# Patient Record
Sex: FEMALE | Race: WHITE | Employment: FULL TIME | URBAN - METROPOLITAN AREA
[De-identification: names, ages, dates, MRNs, and addresses within clinical notes are randomized per-mention and may not be internally consistent; named-entity substitution may affect disease eponyms.]

---

## 2019-11-05 ENCOUNTER — APPOINTMENT (OUTPATIENT)
Dept: CT IMAGING | Age: 59
End: 2019-11-05
Payer: COMMERCIAL

## 2019-11-05 ENCOUNTER — HOSPITAL ENCOUNTER (EMERGENCY)
Age: 59
Discharge: HOME | End: 2019-11-05
Payer: COMMERCIAL

## 2019-11-05 VITALS
SYSTOLIC BLOOD PRESSURE: 135 MMHG | HEART RATE: 57 BPM | BODY MASS INDEX: 41.02 KG/M2 | WEIGHT: 231.48 LBS | OXYGEN SATURATION: 95 % | TEMPERATURE: 96.9 F | HEIGHT: 63 IN | RESPIRATION RATE: 16 BRPM | DIASTOLIC BLOOD PRESSURE: 78 MMHG

## 2019-11-05 DIAGNOSIS — S02.2XXB OPEN FRACTURE OF NASAL BONE, INITIAL ENCOUNTER: ICD-10-CM

## 2019-11-05 DIAGNOSIS — S00.83XA CONTUSION OF FACE, INITIAL ENCOUNTER: ICD-10-CM

## 2019-11-05 DIAGNOSIS — S01.81XA FACIAL LACERATION, INITIAL ENCOUNTER: ICD-10-CM

## 2019-11-05 DIAGNOSIS — S09.90XA INJURY OF HEAD, INITIAL ENCOUNTER: Primary | ICD-10-CM

## 2019-11-05 PROCEDURE — 4500000024 HC ED LEVEL 4 PROCEDURE

## 2019-11-05 PROCEDURE — 99284 EMERGENCY DEPT VISIT MOD MDM: CPT

## 2019-11-05 PROCEDURE — 70486 CT MAXILLOFACIAL W/O DYE: CPT

## 2019-11-05 PROCEDURE — 2500000003 HC RX 250 WO HCPCS: Performed by: PHYSICIAN ASSISTANT

## 2019-11-05 PROCEDURE — 70450 CT HEAD/BRAIN W/O DYE: CPT

## 2019-11-05 RX ORDER — AMOXICILLIN AND CLAVULANATE POTASSIUM 875; 125 MG/1; MG/1
1 TABLET, FILM COATED ORAL 2 TIMES DAILY
Qty: 20 TABLET | Refills: 0 | Status: SHIPPED | OUTPATIENT
Start: 2019-11-05 | End: 2019-11-15

## 2019-11-05 RX ORDER — LIDOCAINE HYDROCHLORIDE AND EPINEPHRINE BITARTRATE 20; .01 MG/ML; MG/ML
20 INJECTION, SOLUTION SUBCUTANEOUS ONCE
Status: COMPLETED | OUTPATIENT
Start: 2019-11-05 | End: 2019-11-05

## 2019-11-05 RX ADMIN — LIDOCAINE HYDROCHLORIDE AND EPINEPHRINE 20 ML: 20; 10 INJECTION, SOLUTION INFILTRATION; PERINEURAL at 09:37

## 2019-11-05 ASSESSMENT — PAIN DESCRIPTION - PROGRESSION: CLINICAL_PROGRESSION: GRADUALLY WORSENING

## 2019-11-05 ASSESSMENT — PAIN SCALES - GENERAL
PAINLEVEL_OUTOF10: 5
PAINLEVEL_OUTOF10: 5

## 2019-11-05 ASSESSMENT — PAIN DESCRIPTION - PAIN TYPE: TYPE: ACUTE PAIN

## 2019-11-05 ASSESSMENT — PAIN - FUNCTIONAL ASSESSMENT: PAIN_FUNCTIONAL_ASSESSMENT: ACTIVITIES ARE NOT PREVENTED

## 2019-11-05 ASSESSMENT — PAIN DESCRIPTION - FREQUENCY: FREQUENCY: CONTINUOUS

## 2019-11-05 ASSESSMENT — PAIN DESCRIPTION - DESCRIPTORS: DESCRIPTORS: ACHING

## 2019-11-05 ASSESSMENT — ENCOUNTER SYMPTOMS
NAUSEA: 0
VOMITING: 0

## 2019-11-05 ASSESSMENT — PAIN DESCRIPTION - LOCATION: LOCATION: FACE

## 2019-11-05 ASSESSMENT — PAIN DESCRIPTION - ONSET: ONSET: ON-GOING

## 2019-11-08 ENCOUNTER — OFFICE VISIT (OUTPATIENT)
Dept: ENT CLINIC | Age: 59
End: 2019-11-08
Payer: COMMERCIAL

## 2019-11-08 VITALS
DIASTOLIC BLOOD PRESSURE: 70 MMHG | HEART RATE: 67 BPM | WEIGHT: 229 LBS | HEIGHT: 63 IN | TEMPERATURE: 97.7 F | BODY MASS INDEX: 40.57 KG/M2 | SYSTOLIC BLOOD PRESSURE: 143 MMHG

## 2019-11-08 DIAGNOSIS — S02.2XXA CLOSED FRACTURE OF NASAL BONE, INITIAL ENCOUNTER: Primary | ICD-10-CM

## 2019-11-08 DIAGNOSIS — S01.81XA FACIAL LACERATION, INITIAL ENCOUNTER: ICD-10-CM

## 2019-11-08 PROCEDURE — G8417 CALC BMI ABV UP PARAM F/U: HCPCS | Performed by: OTOLARYNGOLOGY

## 2019-11-08 PROCEDURE — G8427 DOCREV CUR MEDS BY ELIG CLIN: HCPCS | Performed by: OTOLARYNGOLOGY

## 2019-11-08 PROCEDURE — 3017F COLORECTAL CA SCREEN DOC REV: CPT | Performed by: OTOLARYNGOLOGY

## 2019-11-08 PROCEDURE — 99203 OFFICE O/P NEW LOW 30 MIN: CPT | Performed by: OTOLARYNGOLOGY

## 2019-11-08 PROCEDURE — G8484 FLU IMMUNIZE NO ADMIN: HCPCS | Performed by: OTOLARYNGOLOGY

## 2019-11-08 PROCEDURE — 4004F PT TOBACCO SCREEN RCVD TLK: CPT | Performed by: OTOLARYNGOLOGY

## 2019-11-08 PROCEDURE — S0630 REMOVAL OF SUTURES: HCPCS | Performed by: OTOLARYNGOLOGY

## 2019-11-08 ASSESSMENT — ENCOUNTER SYMPTOMS
RHINORRHEA: 0
SHORTNESS OF BREATH: 0
COLOR CHANGE: 0
CHOKING: 0
BLOOD IN STOOL: 0
DIARRHEA: 0
BACK PAIN: 0
COUGH: 0
VOICE CHANGE: 0
TROUBLE SWALLOWING: 0
STRIDOR: 0
VOMITING: 0
FACIAL SWELLING: 1
SINUS PAIN: 1
WHEEZING: 0
CONSTIPATION: 0
EYE ITCHING: 0
NAUSEA: 0
SINUS PRESSURE: 0
EYE DISCHARGE: 0
PHOTOPHOBIA: 0
SORE THROAT: 0

## 2019-11-13 ENCOUNTER — OFFICE VISIT (OUTPATIENT)
Dept: ENT CLINIC | Age: 59
End: 2019-11-13
Payer: COMMERCIAL

## 2019-11-13 VITALS
DIASTOLIC BLOOD PRESSURE: 68 MMHG | HEART RATE: 66 BPM | WEIGHT: 236 LBS | HEIGHT: 63 IN | SYSTOLIC BLOOD PRESSURE: 153 MMHG | TEMPERATURE: 97.4 F | BODY MASS INDEX: 41.82 KG/M2

## 2019-11-13 DIAGNOSIS — S02.2XXA CLOSED FRACTURE OF NASAL BONE, INITIAL ENCOUNTER: Primary | ICD-10-CM

## 2019-11-13 DIAGNOSIS — S01.81XA FACIAL LACERATION, INITIAL ENCOUNTER: ICD-10-CM

## 2019-11-13 PROCEDURE — 4004F PT TOBACCO SCREEN RCVD TLK: CPT | Performed by: OTOLARYNGOLOGY

## 2019-11-13 PROCEDURE — G8427 DOCREV CUR MEDS BY ELIG CLIN: HCPCS | Performed by: OTOLARYNGOLOGY

## 2019-11-13 PROCEDURE — 99213 OFFICE O/P EST LOW 20 MIN: CPT | Performed by: OTOLARYNGOLOGY

## 2019-11-13 PROCEDURE — G8417 CALC BMI ABV UP PARAM F/U: HCPCS | Performed by: OTOLARYNGOLOGY

## 2019-11-13 PROCEDURE — 3017F COLORECTAL CA SCREEN DOC REV: CPT | Performed by: OTOLARYNGOLOGY

## 2019-11-13 PROCEDURE — G8484 FLU IMMUNIZE NO ADMIN: HCPCS | Performed by: OTOLARYNGOLOGY

## 2019-11-13 ASSESSMENT — ENCOUNTER SYMPTOMS
TROUBLE SWALLOWING: 0
FACIAL SWELLING: 0
EYE PAIN: 0
VOICE CHANGE: 0
SHORTNESS OF BREATH: 0
DIARRHEA: 0
RHINORRHEA: 0
COLOR CHANGE: 0
SORE THROAT: 0
SINUS PRESSURE: 0
COUGH: 0
CHOKING: 0
EYE ITCHING: 0
EYE REDNESS: 0
PHOTOPHOBIA: 0
NAUSEA: 0
STRIDOR: 0
SINUS PAIN: 0

## 2020-12-10 ENCOUNTER — HOSPITAL ENCOUNTER (OUTPATIENT)
Dept: PHYSICAL THERAPY | Age: 60
Setting detail: THERAPIES SERIES
Discharge: HOME | End: 2020-12-10
Payer: COMMERCIAL

## 2020-12-10 PROCEDURE — 97140 MANUAL THERAPY 1/> REGIONS: CPT

## 2020-12-10 PROCEDURE — 97110 THERAPEUTIC EXERCISES: CPT

## 2020-12-10 PROCEDURE — 97161 PT EVAL LOW COMPLEX 20 MIN: CPT

## 2020-12-10 NOTE — FLOWSHEET NOTE
[facility] - Outpatient Rehabilitation & Therapy  [address]. [place]  Phone: [phone]   Fax:     [phone]      Physical Therapy Treatment Note/ Progress Report:     Date:  12/10/2020    Patient Name:  Jess Cullen    :  1960  MRN: [id number]    Pertinent Medical History:Additional Pertinent Hx: HTN    Medical/Treatment Diagnosis Information:  · Diagnosis: Left Distal Femoral Jjacajng34/4/20  · Treatment Diagnosis: decreased abilty to a,mulate and function    Insurance/Certification information:  PT Insurance Information: [employer]  Physician Information:  Referring Practitioner: Dr. Anirudh Flor of care signed (Y/N): routed    Date of Patient follow up with Physician:      Progress Report: []  Yes  [x]  No     Date Range for reporting period:  Beginnin/10/2020  Ending:      Progress report due (10 Rx/or 30 days whichever is less):      Recertification due (POC duration/ or 90 days whichever is less): 3/10/20     Visit # POC/Insurance Allowable Auth Needed   1 30 []Yes   []No     Latex Allergy:  [x]NO      []YES  Preferred Language for Healthcare:   [x]English       []Other:    Functional Scale:      Date assessed: at eval  Test: LEFS-22  Score:    Pain level:  5/10     History of Injury: Patient is a 64 y/o female who fell in the shower and fx her distal femur on 20 with a resultant surgery on 20. She was seen at home for PT up until last week. She c/o moderate pain at this time. She wakes from pain and is limited with all activities. She is ambulating with a walker and a rolling walker and I fwb with the walker. She works a a  and sits most of the day. She hoeps to return to full function. She did fall last week at home. She had a Retrograde intramedullary rodding procedure to fix it.     SUBJECTIVE:  See eval    OBJECTIVE:  ROM LEFT RIGHT   HIP Flex 80 wfl   HIP Abd 15     HIP Ext 5     HIP IR 15     HIP ER 10     Knee ext -15     Knee Flex 90     Ankle PF wfl     Ankle DF 5     Ankle In       Ankle Ev       Strength  LEFT RIGHT   HIP Flexors 4-     HIP Abductors 4-     HIP Ext 4-     Hip ER 4-     Knee EXT (quad) 3     Knee Flex (HS) 3+-     Ankle DF 4+     Ankle PF 4+     Ankle Inv 4+     Ankle EV 4+             Circumference  Mid apex  7 cm prox    60  67                  RESTRICTIONS/PRECAUTIONS: fwb in walker    Exercises/Interventions:     Therapeutic Ex (74759)   Min: Reps/Resistance Notes/CUES   Nu step     Leg press     qs     laq/saq     clams     Sl abd     Wobble      Step ups     Semi squat     Gait with cane                    Manual Intervention (12272)  Min:     Knee mobs/PROM Gr 3    Tib/Fem Mobs Gr 3    Patella Mobs gr3    Ankle mobs Prom flex and ext              NMR re-education (58985)  Min:  CUES NEEDED             Therapeutic Activity (53984)  Min:               Modalities  Min:     IFC with      CP after exercises     MH after exercises            Other Therapeutic Activities: Pt was educated on PT POC, Diagnosis, Prognosis, pathomechanics as well as frequency and duration of scheduling future physical therapy appointments. Time was also taken on this day to answer all patient questions and participation in PT. Reviewed appointment policy in detail with patient and patient verbalized understanding. Home Exercise Program: Patient was instructed in the following for HEP:     . Patient verbalized/demonstrated understanding and was issued written handout.   HEP  Hams stretch, gastroc stretch 30 x 3, qs x 20, slr x 20, seated flex stretch 30 x 3      Therapeutic Exercise and NMR EXR  [] (68747) Provided verbal/tactile cueing for activities related to strengthening, flexibility, endurance, ROM for improvements in LE, proximal hip, and core control with self care, mobility, lifting, ambulation.  [] (00588) Provided verbal/tactile cueing for activities related to improving balance, coordination, kinesthetic sense, posture, motor skill, proprioception  to assist with LE, proximal hip, and core control in self care, mobility, lifting, ambulation and eccentric single leg control. NMR and Therapeutic Activities:    [] (48749 or 76522) Provided verbal/tactile cueing for activities related to improving balance, coordination, kinesthetic sense, posture, motor skill, proprioception and motor activation to allow for proper function of core, proximal hip and LE with self care and ADLs and functional mobility.   [] (54772) Gait Re-education- Provided training and instruction to the patient for proper LE, core and proximal hip recruitment and positioning and eccentric body weight control with ambulation re-education including up and down stairs     Home Exercise Program:    [x] (45358) Reviewed/Progressed HEP activities related to strengthening, flexibility, endurance, ROM of core, proximal hip and LE for functional self-care, mobility, lifting and ambulation/stair navigation   [] (49451)Reviewed/Progressed HEP activities related to improving balance, coordination, kinesthetic sense, posture, motor skill, proprioception of core, proximal hip and LE for self care, mobility, lifting, and ambulation/stair navigation      Manual Treatments:  PROM / STM / Oscillations-Mobs:  G-I, II, III, IV (PA's, Inf., Post.)  [] (38241) Provided manual therapy to mobilize LE, proximal hip and/or LS spine soft tissue/joints for the purpose of modulating pain, promoting relaxation,  increasing ROM, reducing/eliminating soft tissue swelling/inflammation/restriction, improving soft tissue extensibility and allowing for proper ROM for normal function with self care, mobility, lifting and ambulation.      If [facility] Please Indicate Time In/Out  CPT Code Time in Time out                         Charges:  Timed Code Treatment Minutes: 30   Total Treatment Minutes: 45      [x] EVAL (LOW) 31369 (typically 20 minutes face-to-face)  [] EVAL (MOD) 98919 (typically 30 minutes face-to-face)  [] EVAL (HIGH) 38250 (typically 45 minutes face-to-face)  [] RE-EVAL     [x] OV(92884) x 1   [] Dry needle 1 or 2 Muscles (21661)  [] NMR (76321) x     [] Dry needle 3+ Muscles (73458)  [x] Manual (48722) x1     [] Ultrasound (09315) x  [] TA (32253) x     [] Mech Traction (06360)  [] ES(attended) (53091)     [] ES (un) (29258):   [] Vasopump (02488) [] Ionto (16552)   [] Other:    Edmond Emerson:  Patient stated goal: \" I want to walk and function like normal \"  []? Progressing: []? Met: []? Not Met: []? Adjusted     Therapist goals for Patient:   Short Term Goals: To be achieved in: 2 weeks  1. Independent in HEP and progression per patient tolerance, in order to prevent re-injury. []? Progressing: []? Met: []? Not Met: []? Adjusted  2. Patient will have a decrease in pain by 25% to facilitate improvement in movement, function, and ADLs as indicated by Functional Deficits. []? Progressing: []? Met: []? Not Met: []? Adjusted     Long Term Goals: To be achieved in: 8 weeks  1. Disability index score of 25% or less for the LEFS to assist with reaching prior level of function. []? Progressing: []? Met: []? Not Met: []? Adjusted  2. Patient will demonstrate increased AROM to 110, -5  to allow for proper joint functioning as indicated by patients Functional Deficits. []? Progressing: []? Met: []? Not Met: []? Adjusted  3. Patient will demonstrate an increase in Strength to good proximal hip strength and control, within 5lb HHD in LE to allow for proper functional mobility as indicated by patients Functional Deficits. []? Progressing: []? Met: []? Not Met: []? Adjusted  4. Patient will return togait with cane  functional activities without increased symptoms or restriction. []? Progressing: []? Met: []? Not Met: []? Adjusted  5. (patient specific functional goal)    []? Progressing: []? Met: []? Not Met: []?  Adjusted         ASSESSMENT:  See eval    Treatment/Activity Tolerance:  [x] Patient tolerated treatment well [] Patient limited by fatique  [] Patient limited by pain  [] Patient limited by other medical complications  [] Other:     Overall Progression Towards Functional goals/ Treatment Progress Update:  [] Patient is progressing as expected towards functional goals listed. [] Progression is slowed due to complexities/Impairments listed. [] Progression has been slowed due to co-morbidities. [x] Plan just implemented, too soon to assess goals progression <30days   [] Goals require adjustment due to lack of progress  [] Patient is not progressing as expected and requires additional follow up with physician  [] Other    Prognosis for POC: [x] Good [] Fair  [] Poor    Patient requires continued skilled intervention: [x] Yes  [] No        PLAN: LE arom, prom  strength, proprioception, gait, balance, functional activities. Mfr, joint mobs, mods as needed, hep. Progress as tolerated, rom, quads strength , end range flex and ext, progress to gait, gait with cane, balance and proprio    [] Continue per plan of care [] Alter current plan (see comments)  [x] Plan of care initiated [] Hold pending MD visit [] Discharge    Electronically signed by: Marika Roque PT    Note: If patient does not return for scheduled/recommended follow up visits, this note will serve as a discharge from care along with the most recent update on progress.

## 2020-12-16 ENCOUNTER — HOSPITAL ENCOUNTER (OUTPATIENT)
Dept: PHYSICAL THERAPY | Age: 60
Setting detail: THERAPIES SERIES
Discharge: HOME | End: 2020-12-16
Payer: COMMERCIAL

## 2020-12-16 PROCEDURE — 97110 THERAPEUTIC EXERCISES: CPT

## 2020-12-16 PROCEDURE — 97140 MANUAL THERAPY 1/> REGIONS: CPT

## 2020-12-16 NOTE — FLOWSHEET NOTE
[facility] - Outpatient Rehabilitation & Therapy  [address]. [place]  Phone: [phone]   Fax:     [phone]      Physical Therapy Treatment Note/ Progress Report:     Date:  2020    Patient Name:  Tom Stapleton    :  1960  MRN: [id number]    Pertinent Medical History:Additional Pertinent Hx: HTN    Medical/Treatment Diagnosis Information:  · Diagnosis: Left Distal Femoral Crbkssqy82/4/20  · Treatment Diagnosis: decreased abilty to a,mulate and function    Insurance/Certification information:  PT Insurance Information: [facility]  Physician Information:  Referring Practitioner: Dr. Masha Cisneros of care signed (Y/N): routed    Date of Patient follow up with Physician:      Progress Report: []  Yes  [x]  No     Date Range for reporting period:  Beginnin2020  Ending:      Progress report due (10 Rx/or 30 days whichever is less):      Recertification due (POC duration/ or 90 days whichever is less): 3/10/20     Visit # POC/Insurance Allowable Auth Needed   2 30 []Yes   []No     Latex Allergy:  [x]NO      []YES  Preferred Language for Healthcare:   [x]English       []Other:    Functional Scale:      Date assessed: at eval  Test: LEFS-22  Score:    Pain level:  5/10     History of Injury: Patient is a 64 y/o female who fell in the shower and fx her distal femur on 20 with a resultant surgery on 20. She was seen at home for PT up until last week. She c/o moderate pain at this time. She wakes from pain and is limited with all activities. She is ambulating with a walker and a rolling walker and I fwb with the walker. She works a a  and sits most of the day. She hoeps to return to full function. She did fall last week at home. She had a Retrograde intramedullary rodding procedure to fix it. SUBJECTIVE:  20 Patient reports soreness on lateral hip area.     OBJECTIVE:  ROM LEFT RIGHT   HIP Flex 80 wfl   HIP Abd 15     HIP Ext 5     HIP IR 15     HIP ER 10     Knee ext -15     Knee Flex 90     Ankle PF wfl     Ankle DF 5     Ankle In       Ankle Ev       Strength  LEFT RIGHT   HIP Flexors 4-     HIP Abductors 4-     HIP Ext 4-     Hip ER 4-     Knee EXT (quad) 3     Knee Flex (HS) 3+-     Ankle DF 4+     Ankle PF 4+     Ankle Inv 4+     Ankle EV 4+             Circumference  Mid apex  7 cm prox    60  67                  RESTRICTIONS/PRECAUTIONS: fwb in walker    Exercises/Interventions:     Therapeutic Ex (49468)   Min: Reps/Resistance Notes/CUES   Nu step seat 9 L3  5 min    Leg press 60 # 2 x 20    qs 5 sec x 1o    laq/saq 2 # 20 x each    clams 20 x     Sl abd     Wobble      Step ups     Semi squat 5 sec x 15     Gait with cane     incline 3 x 30 sec               Manual Intervention (29982)  Min:     Knee mobs/PROM Gr 3    Tib/Fem Mobs Gr 3    Patella Mobs gr3    Ankle mobs Prom flex and ext              NMR re-education (00316)  Min:  CUES NEEDED             Therapeutic Activity (28470)  Min:               Modalities  Min:     IFC with      CP after exercises     MH after exercises            Other Therapeutic Activities: Pt was educated on PT POC, Diagnosis, Prognosis, pathomechanics as well as frequency and duration of scheduling future physical therapy appointments. Time was also taken on this day to answer all patient questions and participation in PT. Reviewed appointment policy in detail with patient and patient verbalized understanding. Home Exercise Program: Patient was instructed in the following for HEP:     . Patient verbalized/demonstrated understanding and was issued written handout.   HEP  Hams stretch, gastroc stretch 30 x 3, qs x 20, slr x 20, seated flex stretch 30 x 3      Therapeutic Exercise and NMR EXR  [] (12007) Provided verbal/tactile cueing for activities related to strengthening, flexibility, endurance, ROM for improvements in LE, proximal hip, and Charges:  Timed Code Treatment Minutes: 40   Total Treatment Minutes: 45      [] EVAL (LOW) 83633 (typically 20 minutes face-to-face)  [] EVAL (MOD) 42129 (typically 30 minutes face-to-face)  [] EVAL (HIGH) 72081 (typically 45 minutes face-to-face)  [] RE-EVAL     [x] NR(99558) x 2   [] Dry needle 1 or 2 Muscles (18245)  [] NMR (61811) x     [] Dry needle 3+ Muscles (94139)  [x] Manual (31368) x1     [] Ultrasound (63533) x  [] TA (96986) x     [] Mech Traction (66919)  [] ES(attended) (21966)     [] ES (un) (72415):   [] Vasopump (59789) [] Ionto (11016)   [] Other:    Greenville Gauze:  Patient stated goal: \" I want to walk and function like normal \"  []? Progressing: []? Met: []? Not Met: []? Adjusted     Therapist goals for Patient:   Short Term Goals: To be achieved in: 2 weeks  1. Independent in HEP and progression per patient tolerance, in order to prevent re-injury. []? Progressing: []? Met: []? Not Met: []? Adjusted  2. Patient will have a decrease in pain by 25% to facilitate improvement in movement, function, and ADLs as indicated by Functional Deficits. []? Progressing: []? Met: []? Not Met: []? Adjusted     Long Term Goals: To be achieved in: 8 weeks  1. Disability index score of 25% or less for the LEFS to assist with reaching prior level of function. []? Progressing: []? Met: []? Not Met: []? Adjusted  2. Patient will demonstrate increased AROM to 110, -5  to allow for proper joint functioning as indicated by patients Functional Deficits. []? Progressing: []? Met: []? Not Met: []? Adjusted  3. Patient will demonstrate an increase in Strength to good proximal hip strength and control, within 5lb HHD in LE to allow for proper functional mobility as indicated by patients Functional Deficits. []? Progressing: []? Met: []? Not Met: []? Adjusted  4. Patient will return togait with cane  functional activities without increased symptoms or restriction. []? Progressing: []? Met: []?  Not Met: []? Adjusted  5. (patient specific functional goal)    []? Progressing: []? Met: []? Not Met: []? Adjusted         ASSESSMENT:  See eval    Treatment/Activity Tolerance:  [x] Patient tolerated treatment well [] Patient limited by fatique  [] Patient limited by pain  [] Patient limited by other medical complications  [] Other:     Overall Progression Towards Functional goals/ Treatment Progress Update:  [] Patient is progressing as expected towards functional goals listed. [] Progression is slowed due to complexities/Impairments listed. [] Progression has been slowed due to co-morbidities. [x] Plan just implemented, too soon to assess goals progression <30days   [] Goals require adjustment due to lack of progress  [] Patient is not progressing as expected and requires additional follow up with physician  [] Other    Prognosis for POC: [x] Good [] Fair  [] Poor    Patient requires continued skilled intervention: [x] Yes  [] No        PLAN: LE arom, prom  strength, proprioception, gait, balance, functional activities. Mfr, joint mobs, mods as needed, hep. Progress as tolerated, rom, quads strength , end range flex and ext, progress to gait, gait with cane, balance and proprio    [] Continue per plan of care [] Alter current plan (see comments)  [x] Plan of care initiated [] Hold pending MD visit [] Discharge    Electronically signed by: Damien Marquez PTA    Note: If patient does not return for scheduled/recommended follow up visits, this note will serve as a discharge from care along with the most recent update on progress.

## 2020-12-18 ENCOUNTER — APPOINTMENT (OUTPATIENT)
Dept: PHYSICAL THERAPY | Age: 60
End: 2020-12-18
Payer: COMMERCIAL

## 2020-12-22 ENCOUNTER — HOSPITAL ENCOUNTER (OUTPATIENT)
Dept: PHYSICAL THERAPY | Age: 60
Setting detail: THERAPIES SERIES
Discharge: HOME | End: 2020-12-22
Payer: COMMERCIAL

## 2020-12-22 PROCEDURE — 97110 THERAPEUTIC EXERCISES: CPT

## 2020-12-22 PROCEDURE — 97140 MANUAL THERAPY 1/> REGIONS: CPT

## 2020-12-22 NOTE — FLOWSHEET NOTE
\" I am sore in my hip, concerned about the pain \"    OBJECTIVE:  ROM LEFT RIGHT   HIP Flex 80 wfl   HIP Abd 15     HIP Ext 5     HIP IR 15     HIP ER 10     Knee ext -15     Knee Flex 90     Ankle PF wfl     Ankle DF 5     Ankle In       Ankle Ev       Strength  LEFT RIGHT   HIP Flexors 4-     HIP Abductors 4-     HIP Ext 4-     Hip ER 4-     Knee EXT (quad) 3     Knee Flex (HS) 3+-     Ankle DF 4+     Ankle PF 4+     Ankle Inv 4+     Ankle EV 4+             Circumference  Mid apex  7 cm prox    60  67                  RESTRICTIONS/PRECAUTIONS: fwb in walker    Exercises/Interventions:     Therapeutic Ex (35392)   Min: Reps/Resistance Notes/CUES   Nu step seat 9 L3  5 min    Leg press 60 # 2 x 20, left 30# x 30    qs 5 sec x 1o    bridges X 30    laq/saq 2 # 20 x each    Piriformis stretch 30 x 3 , gentle manual    clams 20 x     abd slide X 30. iso x 10    Sl abd unable    Standing abd X 30 slow, right x 20    abd with tband          Wobble      Step ups     Semi squat 5 sec x 15     Gait with cane     incline 3 x 30 sec                                   Manual Intervention (52789)  Min: Mfr to left itb, gluts, piriformis    Knee mobs/PROM Gr 3    Tib/Fem Mobs Gr 3    Patella Mobs gr3    Ankle mobs Prom flex and ext              NMR re-education (44956)  Min:  CUES NEEDED             Therapeutic Activity (93581)  Min:               Modalities  Min:     IFC with      CP after exercises     MH after exercises            Other Therapeutic Activities: Pt was educated on PT POC, Diagnosis, Prognosis, pathomechanics as well as frequency and duration of scheduling future physical therapy appointments. Time was also taken on this day to answer all patient questions and participation in PT. Reviewed appointment policy in detail with patient and patient verbalized understanding.      Home Exercise Program: Patient was instructed in the following for HEP:     . Patient verbalized/demonstrated understanding and was issued written handout. HEP  Hams stretch, gastroc stretch 30 x 3, qs x 20, slr x 20, seated flex stretch 30 x 3      Therapeutic Exercise and NMR EXR  [] (73184) Provided verbal/tactile cueing for activities related to strengthening, flexibility, endurance, ROM for improvements in LE, proximal hip, and core control with self care, mobility, lifting, ambulation.  [] (74427) Provided verbal/tactile cueing for activities related to improving balance, coordination, kinesthetic sense, posture, motor skill, proprioception  to assist with LE, proximal hip, and core control in self care, mobility, lifting, ambulation and eccentric single leg control.      NMR and Therapeutic Activities:    [] (95666 or 85243) Provided verbal/tactile cueing for activities related to improving balance, coordination, kinesthetic sense, posture, motor skill, proprioception and motor activation to allow for proper function of core, proximal hip and LE with self care and ADLs and functional mobility.   [] (91374) Gait Re-education- Provided training and instruction to the patient for proper LE, core and proximal hip recruitment and positioning and eccentric body weight control with ambulation re-education including up and down stairs     Home Exercise Program:    [x] (50424) Reviewed/Progressed HEP activities related to strengthening, flexibility, endurance, ROM of core, proximal hip and LE for functional self-care, mobility, lifting and ambulation/stair navigation   [] (70394)Reviewed/Progressed HEP activities related to improving balance, coordination, kinesthetic sense, posture, motor skill, proprioception of core, proximal hip and LE for self care, mobility, lifting, and ambulation/stair navigation      Manual Treatments:  PROM / STM / Oscillations-Mobs:  G-I, II, III, IV (PA's, Inf., Post.)  [] (28955) Provided manual therapy to mobilize LE, proximal hip and/or LS spine soft tissue/joints for the purpose of modulating pain, promoting relaxation, in LE to allow for proper functional mobility as indicated by patients Functional Deficits. []? Progressing: []? Met: []? Not Met: []? Adjusted  4. Patient will return togait with cane  functional activities without increased symptoms or restriction. []? Progressing: []? Met: []? Not Met: []? Adjusted  5. (patient specific functional goal)    []? Progressing: []? Met: []? Not Met: []? Adjusted         ASSESSMENT:  See eval    Treatment/Activity Tolerance:  [x] Patient tolerated treatment well [] Patient limited by fatique  [] Patient limited by pain  [] Patient limited by other medical complications  [] Other:     Overall Progression Towards Functional goals/ Treatment Progress Update:  [] Patient is progressing as expected towards functional goals listed. [] Progression is slowed due to complexities/Impairments listed. [] Progression has been slowed due to co-morbidities. [x] Plan just implemented, too soon to assess goals progression <30days   [] Goals require adjustment due to lack of progress  [] Patient is not progressing as expected and requires additional follow up with physician  [] Other    Prognosis for POC: [x] Good [] Fair  [] Poor    Patient requires continued skilled intervention: [x] Yes  [] No        PLAN: LE arom, prom  strength, proprioception, gait, balance, functional activities. Mfr, joint mobs, mods as needed, hep. Progress as tolerated, rom, quads strength , end range flex and ext, progress to gait, gait with cane, balance and proprio, 12.22.20  Work on abduction strength    [] Continue per plan of care [] Alter current plan (see comments)  [x] Plan of care initiated [] Hold pending MD visit [] Discharge    Electronically signed by: Saadia Gutierrez PT    Note: If patient does not return for scheduled/recommended follow up visits, this note will serve as a discharge from care along with the most recent update on progress.

## 2020-12-24 ENCOUNTER — HOSPITAL ENCOUNTER (OUTPATIENT)
Dept: PHYSICAL THERAPY | Age: 60
Setting detail: THERAPIES SERIES
Discharge: HOME | End: 2020-12-24
Payer: COMMERCIAL

## 2020-12-24 PROCEDURE — 97140 MANUAL THERAPY 1/> REGIONS: CPT

## 2020-12-24 PROCEDURE — 97110 THERAPEUTIC EXERCISES: CPT

## 2020-12-24 NOTE — FLOWSHEET NOTE
[facility] - Outpatient Rehabilitation & Therapy  [address] [facility]. [address] [place] [address]  Phone: [phone]   Fax:     [phone]      Physical Therapy Treatment Note/ Progress Report:     Date:  2020    Patient Name:  Vamsi Pollock    :  1960  MRN: [id number]    Pertinent Medical History:Additional Pertinent Hx: HTN    Medical/Treatment Diagnosis Information:  · Diagnosis: Left Distal Femoral Cpkgztbc19/4/20  · Treatment Diagnosis: decreased abilty to a,mulate and function    Insurance/Certification information:  PT Insurance Information: [facility]  Physician Information:  Referring Practitioner: Dr. Ronald Gil of care signed (Y/N): routed    Date of Patient follow up with Physician:      Progress Report: []  Yes  [x]  No     Date Range for reporting period:  Beginnin2020  Ending:      Progress report due (10 Rx/or 30 days whichever is less):      Recertification due (POC duration/ or 90 days whichever is less): 3/10/20     Visit # POC/Insurance Allowable Auth Needed   4 30 []Yes   []No     Latex Allergy:  [x]NO      []YES  Preferred Language for Healthcare:   [x]English       []Other:    Functional Scale:      Date assessed: at eval  Test: LEFS-22  Score:    Pain level:  5/10     History of Injury: Patient is a 64 y/o female who fell in the shower and fx her distal femur on 20 with a resultant surgery on 20. She was seen at home for PT up until last week. She c/o moderate pain at this time. She wakes from pain and is limited with all activities. She is ambulating with a walker and a rolling walker and I fwb with the walker. She works a a  and sits most of the day. She hoeps to return to full function. She did fall last week at home. She had a Retrograde intramedullary rodding procedure to fix it. SUBJECTIVE:  20 Patient reports soreness on lateral hip area.   20  Pt states, \" I am sore in my hip, concerned about the pain \"  12/24/20  Pt states, \" doing ok \"    OBJECTIVE:  ROM LEFT RIGHT   HIP Flex 80 wfl   HIP Abd 15     HIP Ext 5     HIP IR 15     HIP ER 10     Knee ext -15     Knee Flex 90     Ankle PF wfl     Ankle DF 5     Ankle In       Ankle Ev       Strength  LEFT RIGHT   HIP Flexors 4-     HIP Abductors 4-     HIP Ext 4-     Hip ER 4-     Knee EXT (quad) 3     Knee Flex (HS) 3+-     Ankle DF 4+     Ankle PF 4+     Ankle Inv 4+     Ankle EV 4+             Circumference  Mid apex  7 cm prox    60  67                  RESTRICTIONS/PRECAUTIONS: fwb in walker    Exercises/Interventions:     Therapeutic Ex (02492)   Min: Reps/Resistance Notes/CUES   Nu step seat 9 L3  5 min    Leg press 60 # 2 x 20, left 30# x 30    ppt With ball x 30    qs 5 sec x 1o    bridges X 30    slr X 30    laq/saq 4 # 20 x each    Piriformis stretch 30 x 3 , gentle manual    clams 20 x     abd slide X 30. iso x 10    Sl abd unable    Standing abd X 30 slow, right x 20    abd with tband     Semi squat X 20    Wobble      Step ups     Semi squat 5 sec x 15     Gait with cane     Gait in bars X 2 min sliding    incline 3 x 30 sec          HEP                         Manual Intervention (43521)  Min: Mfr to left itb, gluts, piriformis    Knee mobs/PROM Gr 3    Tib/Fem Mobs Gr 3    Patella Mobs gr3    Ankle mobs Prom flex and ext              NMR re-education (86142)  Min:  CUES NEEDED             Therapeutic Activity (99033)  Min:               Modalities  Min:     IFC with      CP after exercises     MH after exercises            Other Therapeutic Activities: Pt was educated on PT POC, Diagnosis, Prognosis, pathomechanics as well as frequency and duration of scheduling future physical therapy appointments. Time was also taken on this day to answer all patient questions and participation in PT. Reviewed appointment policy in detail with patient and patient verbalized understanding.      Home Exercise Program: Patient was instructed in the following for HEP:     . Patient verbalized/demonstrated understanding and was issued written handout. HEP  Hams stretch, gastroc stretch 30 x 3, qs x 20, slr x 20, seated flex stretch 30 x 3      Therapeutic Exercise and NMR EXR  [] (88749) Provided verbal/tactile cueing for activities related to strengthening, flexibility, endurance, ROM for improvements in LE, proximal hip, and core control with self care, mobility, lifting, ambulation.  [] (68858) Provided verbal/tactile cueing for activities related to improving balance, coordination, kinesthetic sense, posture, motor skill, proprioception  to assist with LE, proximal hip, and core control in self care, mobility, lifting, ambulation and eccentric single leg control.      NMR and Therapeutic Activities:    [] (41009 or 91845) Provided verbal/tactile cueing for activities related to improving balance, coordination, kinesthetic sense, posture, motor skill, proprioception and motor activation to allow for proper function of core, proximal hip and LE with self care and ADLs and functional mobility.   [] (34968) Gait Re-education- Provided training and instruction to the patient for proper LE, core and proximal hip recruitment and positioning and eccentric body weight control with ambulation re-education including up and down stairs     Home Exercise Program:    [x] (94155) Reviewed/Progressed HEP activities related to strengthening, flexibility, endurance, ROM of core, proximal hip and LE for functional self-care, mobility, lifting and ambulation/stair navigation   [] (76396)Reviewed/Progressed HEP activities related to improving balance, coordination, kinesthetic sense, posture, motor skill, proprioception of core, proximal hip and LE for self care, mobility, lifting, and ambulation/stair navigation      Manual Treatments:  PROM / STM / Oscillations-Mobs:  G-I, II, III, IV (PA's, Inf., Post.)  [] (01667) Provided manual therapy to mobilize LE, proximal hip and/or LS spine soft tissue/joints for the purpose of modulating pain, promoting relaxation,  increasing ROM, reducing/eliminating soft tissue swelling/inflammation/restriction, improving soft tissue extensibility and allowing for proper ROM for normal function with self care, mobility, lifting and ambulation. If BWC Please Indicate Time In/Out  CPT Code Time in Time out                         Charges:  Timed Code Treatment Minutes: 45   Total Treatment Minutes: 45      [] EVAL (LOW) 73570 (typically 20 minutes face-to-face)  [] EVAL (MOD) 53875 (typically 30 minutes face-to-face)  [] EVAL (HIGH) 91617 (typically 45 minutes face-to-face)  [] RE-EVAL     [x] MB(90618) x 2   [] Dry needle 1 or 2 Muscles (53460)  [] NMR (72587) x     [] Dry needle 3+ Muscles (14748)  [x] Manual (33637) x1     [] Ultrasound (50600) x  [] TA (78748) x     [] Mech Traction (92145)  [] ES(attended) (51480)     [] ES (un) (64080):   [] Vasopump (30190) [] Ionto (86684)   [] Other:    Candace Primrose:  Patient stated goal: \" I want to walk and function like normal \"  []? Progressing: []? Met: []? Not Met: []? Adjusted     Therapist goals for Patient:   Short Term Goals: To be achieved in: 2 weeks  1. Independent in HEP and progression per patient tolerance, in order to prevent re-injury. []? Progressing: []? Met: []? Not Met: []? Adjusted  2. Patient will have a decrease in pain by 25% to facilitate improvement in movement, function, and ADLs as indicated by Functional Deficits. []? Progressing: []? Met: []? Not Met: []? Adjusted     Long Term Goals: To be achieved in: 8 weeks  1. Disability index score of 25% or less for the LEFS to assist with reaching prior level of function. []? Progressing: []? Met: []? Not Met: []? Adjusted  2. Patient will demonstrate increased AROM to 110, -5  to allow for proper joint functioning as indicated by patients Functional Deficits. []? Progressing: []? Met: []?  Not Met: []? Adjusted  3. Patient will demonstrate an increase in Strength to good proximal hip strength and control, within 5lb HHD in LE to allow for proper functional mobility as indicated by patients Functional Deficits. []? Progressing: []? Met: []? Not Met: []? Adjusted  4. Patient will return togait with cane  functional activities without increased symptoms or restriction. []? Progressing: []? Met: []? Not Met: []? Adjusted  5. (patient specific functional goal)    []? Progressing: []? Met: []? Not Met: []? Adjusted         ASSESSMENT:  See eval    Treatment/Activity Tolerance:  [x] Patient tolerated treatment well [] Patient limited by fatique  [] Patient limited by pain  [] Patient limited by other medical complications  [] Other:     Overall Progression Towards Functional goals/ Treatment Progress Update:  [] Patient is progressing as expected towards functional goals listed. [] Progression is slowed due to complexities/Impairments listed. [] Progression has been slowed due to co-morbidities. [x] Plan just implemented, too soon to assess goals progression <30days   [] Goals require adjustment due to lack of progress  [] Patient is not progressing as expected and requires additional follow up with physician  [] Other    Prognosis for POC: [x] Good [] Fair  [] Poor    Patient requires continued skilled intervention: [x] Yes  [] No        PLAN: LE arom, prom  strength, proprioception, gait, balance, functional activities. Mfr, joint mobs, mods as needed, hep.  Progress as tolerated, rom, quads strength , end range flex and ext, progress to gait, gait with cane, balance and proprio, 12.22.20  Work on abduction strength    [] Continue per plan of care [] Alter current plan (see comments)  [x] Plan of care initiated [] Hold pending MD visit [] Discharge    Electronically signed by: Erik Camarena PT    Note: If patient does not return for scheduled/recommended follow up visits, this note will serve as a discharge from [facility] along with the most recent update on progress.

## 2020-12-29 ENCOUNTER — HOSPITAL ENCOUNTER (OUTPATIENT)
Dept: PHYSICAL THERAPY | Age: 60
Setting detail: THERAPIES SERIES
Discharge: HOME | End: 2020-12-29
Payer: COMMERCIAL

## 2020-12-29 PROCEDURE — 97110 THERAPEUTIC EXERCISES: CPT

## 2020-12-29 PROCEDURE — 97140 MANUAL THERAPY 1/> REGIONS: CPT

## 2020-12-29 NOTE — FLOWSHEET NOTE
[facility] - Outpatient Rehabilitation & Therapy  [address] [facility]. [place]  Phone: [phone]   Fax:     [phone]      Physical Therapy Treatment Note/ Progress Report:     Date:  2020    Patient Name:  Naldo Miguel    :  1960  MRN: [id number]    Pertinent Medical History:Additional Pertinent Hx: HTN    Medical/Treatment Diagnosis Information:  · Diagnosis: Left Distal Femoral Eqdecxuu68/4/20  · Treatment Diagnosis: decreased abilty to a,mulate and function    Insurance/Certification information:  PT Insurance Information: [facility]  Physician Information:  Referring Practitioner: Dr. Thanh Pollard of care signed (Y/N): routed    Date of Patient follow up with Physician:      Progress Report: []  Yes  [x]  No     Date Range for reporting period:  Beginnin2020  Ending:      Progress report due (10 Rx/or 30 days whichever is less):      Recertification due (POC duration/ or 90 days whichever is less): 3/10/20     Visit # POC/Insurance Allowable Auth Needed   5 30 []Yes   []No     Latex Allergy:  [x]NO      []YES  Preferred Language for Healthcare:   [x]English       []Other:    Functional Scale:      Date assessed: at eval  Test: LEFS-22  Score:    Pain level:  5/10     History of Injury: Patient is a 64 y/o female who fell in the shower and fx her distal femur on 20 with a resultant surgery on 20. She was seen at home for PT up until last week. She c/o moderate pain at this time. She wakes from pain and is limited with all activities. She is ambulating with a walker and a rolling walker and I fwb with the walker. She works a a  and sits most of the day. She hoeps to return to full function. She did fall last week at home. She had a Retrograde intramedullary rodding procedure to fix it. SUBJECTIVE:  20 Patient reports soreness on lateral hip area.   20  Pt states, \" I am sore in my hip, concerned about the pain \"  12/24/20  Pt states, \" doing ok \"  12/29/20  Pt states, \" seems to be getting stronger \"    OBJECTIVE:  ROM LEFT    HIP Flex 80    HIP Abd 15     HIP Ext 5     HIP IR 15     HIP ER 10     Knee ext -15     Knee Flex 90     Ankle PF wfl     Ankle DF 5     Ankle In       Ankle Ev       Strength  LEFT RIGHT   HIP Flexors 4-     HIP Abductors 4-     HIP Ext 4-     Hip ER 4-     Knee EXT (quad) 3     Knee Flex (HS) 3+-     Ankle DF 4+     Ankle PF 4+     Ankle Inv 4+     Ankle EV 4+             Circumference  Mid apex  7 cm prox    60  67                  RESTRICTIONS/PRECAUTIONS: fwb in walker    Exercises/Interventions:     Therapeutic Ex (69332)   Min: Reps/Resistance Notes/CUES   Nu step seat 9 L3  5 min    Leg press 75 # 2 x 20, left 40# x 30    ppt With ball x 30    qs 5 sec x x 2  Min on towel    bridges X 30    slr X 30    laq/saq 5 # 20 x each    Piriformis stretch 30 x 3 , gentle manual    clams 20 x     abd slide X 30. iso x 10    Sl abd unable    Standing abd X 30 slow, right x 20    Standing knee ext X 20    abd with tband     Semi squat X 20    Wobble      Step ups     Semi squat 5 sec x 15     Gait with cane     Gait in bars X 2 min sliding    incline 3 x 30 sec     hss 60 x 3    HEP                         Manual Intervention (49860)  Min: Mfr to left itb, gluts, piriformis    Knee mobs/PROM Gr 3    Tib/Fem Mobs Gr 3    Patella Mobs gr3    Ankle mobs Prom flex and ext              NMR re-education (03206)  Min:  CUES NEEDED             Therapeutic Activity (04454)  Min:               Modalities  Min:     IFC with      CP after exercises     MH after exercises            Other Therapeutic Activities: Pt was educated on PT POC, Diagnosis, Prognosis, pathomechanics as well as frequency and duration of scheduling future physical therapy appointments. Time was also taken on this day to answer all patient questions and participation in PT.  Reviewed appointment policy in detail with patient and patient verbalized understanding. Home Exercise Program: Patient was instructed in the following for HEP:     . Patient verbalized/demonstrated understanding and was issued written handout. HEP  Hams stretch, gastroc stretch 30 x 3, qs x 20, slr x 20, seated flex stretch 30 x 3      Therapeutic Exercise and NMR EXR  [] (19886) Provided verbal/tactile cueing for activities related to strengthening, flexibility, endurance, ROM for improvements in LE, proximal hip, and core control with self care, mobility, lifting, ambulation.  [] (87528) Provided verbal/tactile cueing for activities related to improving balance, coordination, kinesthetic sense, posture, motor skill, proprioception  to assist with LE, proximal hip, and core control in self care, mobility, lifting, ambulation and eccentric single leg control.      NMR and Therapeutic Activities:    [] (64357 or 87376) Provided verbal/tactile cueing for activities related to improving balance, coordination, kinesthetic sense, posture, motor skill, proprioception and motor activation to allow for proper function of core, proximal hip and LE with self care and ADLs and functional mobility.   [] (78607) Gait Re-education- Provided training and instruction to the patient for proper LE, core and proximal hip recruitment and positioning and eccentric body weight control with ambulation re-education including up and down stairs     Home Exercise Program:    [x] (30769) Reviewed/Progressed HEP activities related to strengthening, flexibility, endurance, ROM of core, proximal hip and LE for functional self-care, mobility, lifting and ambulation/stair navigation   [] (40322)Reviewed/Progressed HEP activities related to improving balance, coordination, kinesthetic sense, posture, motor skill, proprioception of core, proximal hip and LE for self care, mobility, lifting, and ambulation/stair navigation      Manual Treatments:  PROM / STM / Oscillations-Mobs:  G-I, II, III, IV (PA's, Inf., Post.)  [] (34966) Provided manual therapy to mobilize LE, proximal hip and/or LS spine soft tissue/joints for the purpose of modulating pain, promoting relaxation,  increasing ROM, reducing/eliminating soft tissue swelling/inflammation/restriction, improving soft tissue extensibility and allowing for proper ROM for normal function with self care, mobility, lifting and ambulation. If BWC Please Indicate Time In/Out  CPT Code Time in Time out                         Charges:  Timed Code Treatment Minutes: 45   Total Treatment Minutes: 45      [] EVAL (LOW) 06534 (typically 20 minutes face-to-face)  [] EVAL (MOD) 83298 (typically 30 minutes face-to-face)  [] EVAL (HIGH) 34894 (typically 45 minutes face-to-face)  [] RE-EVAL     [x] DZ(96312) x 2   [] Dry needle 1 or 2 Muscles (23788)  [] NMR (57352) x     [] Dry needle 3+ Muscles (39562)  [x] Manual (97406) x1     [] Ultrasound (07672) x  [] TA (39571) x     [] Mech Traction (87671)  [] ES(attended) (11623)     [] ES (un) (88032):   [] Vasopump (49424) [] Ionto (88698)   [] Other:    Victoria Taotler:  Patient stated goal: \" I want to walk and function like normal \"  []? Progressing: []? Met: []? Not Met: []? Adjusted     Therapist goals for Patient:   Short Term Goals: To be achieved in: 2 weeks  1. Independent in HEP and progression per patient tolerance, in order to prevent re-injury. []? Progressing: []? Met: []? Not Met: []? Adjusted  2. Patient will have a decrease in pain by 25% to facilitate improvement in movement, function, and ADLs as indicated by Functional Deficits. []? Progressing: []? Met: []? Not Met: []? Adjusted     Long Term Goals: To be achieved in: 8 weeks  1. Disability index score of 25% or less for the LEFS to assist with reaching prior level of function. []? Progressing: []? Met: []? Not Met: []? Adjusted  2.  Patient will demonstrate increased AROM to 110, -5  to allow for proper joint Andreas Ariza, PT    Note: If patient does not return for scheduled/recommended follow up visits, this note will serve as a discharge from care along with the most recent update on progress.

## 2020-12-31 ENCOUNTER — HOSPITAL ENCOUNTER (OUTPATIENT)
Dept: PHYSICAL THERAPY | Age: 60
Setting detail: THERAPIES SERIES
Discharge: HOME | End: 2020-12-31
Payer: COMMERCIAL

## 2020-12-31 PROCEDURE — 97140 MANUAL THERAPY 1/> REGIONS: CPT

## 2020-12-31 PROCEDURE — 97110 THERAPEUTIC EXERCISES: CPT

## 2020-12-31 NOTE — FLOWSHEET NOTE
[facility] - Outpatient Rehabilitation & Therapy  [address]. [place]  Phone: [phone]   Fax:     [phone]      Physical Therapy Treatment Note/ Progress Report:     Date:  2020    Patient Name:  Kei Hernández    :  1960  MRN: [id number]    Pertinent Medical History:Additional Pertinent Hx: HTN    Medical/Treatment Diagnosis Information:  · Diagnosis: Left Distal Femoral Rcykkcxm53/4/20  · Treatment Diagnosis: decreased abilty to a,mulate and function    Insurance/Certification information:  PT Insurance Information: [facility]  Physician Information:  Referring Practitioner: Dr. Zavala Verma of care signed (Y/N): routed    Date of Patient follow up with Physician:      Progress Report: []  Yes  [x]  No     Date Range for reporting period:  Beginnin2020  Ending:      Progress report due (10 Rx/or 30 days whichever is less): 45/89/10     Recertification due (POC duration/ or 90 days whichever is less): 3/10/20     Visit # POC/Insurance Allowable Auth Needed   6 30 []Yes   []No     Latex Allergy:  [x]NO      []YES  Preferred Language for Healthcare:   [x]English       []Other:    Functional Scale:      Date assessed: at eval  Test: LEFS-22  Score:    Pain level:  5/10     History of Injury: Patient is a 62 y/o female who fell in the shower and fx her distal femur on 20 with a resultant surgery on 20. She was seen at home for PT up until last week. She c/o moderate pain at this time. She wakes from pain and is limited with all activities. She is ambulating with a walker and a rolling walker and I fwb with the walker. She works a a  and sits most of the day. She hoeps to return to full function. She did fall last week at home. She had a Retrograde intramedullary rodding procedure to fix it. SUBJECTIVE:  20 Patient reports soreness on lateral hip area.   20  Pt states, \" I am sore in my hip, concerned about the pain \"  12/24/20  Pt states, \" doing ok \"  12/29/20  Pt states, \" seems to be getting stronger \"  12/31/20 Patient reports soreness is minimal just stiffness. OBJECTIVE:  ROM LEFT    HIP Flex 80    HIP Abd 15     HIP Ext 5     HIP IR 15     HIP ER 10     Knee ext -15     Knee Flex 90     Ankle PF wfl     Ankle DF 5     Ankle In       Ankle Ev       Strength  LEFT RIGHT   HIP Flexors 4-     HIP Abductors 4-     HIP Ext 4-     Hip ER 4-     Knee EXT (quad) 3     Knee Flex (HS) 3+-     Ankle DF 4+     Ankle PF 4+     Ankle Inv 4+     Ankle EV 4+             Circumference  Mid apex  7 cm prox    60  67                  RESTRICTIONS/PRECAUTIONS: fwb in walker    Exercises/Interventions:     Therapeutic Ex (30617)   Min: Reps/Resistance Notes/CUES   Nu step seat 9 L3  5 min    Leg press 75 # 2 x 20, left 40# x 30    ppt With ball x 30    qs 5 sec x x 2  Min on towel    bridges X 30    slr X 30    laq/saq 5 # 20 x each    Piriformis stretch 30 x 3 , gentle manual    clams 20 x     abd slide X 30. iso x 10    Sl abd unable    Standing abd X 30 slow, right x 20    Standing knee ext X 20    abd with tband     Semi squat X 20    Wobble      Step ups     Semi squat 5 sec x 15     Gait with cane     Gait in bars X 2 min sliding    incline 3 x 30 sec     hss 60 x 3    HEP                         Manual Intervention (40279)  Min: Mfr to left itb, gluts, piriformis    Knee mobs/PROM Gr 3    Tib/Fem Mobs Gr 3    Patella Mobs gr3    Ankle mobs Prom flex and ext              NMR re-education (31083)  Min:  CUES NEEDED             Therapeutic Activity (59471)  Min:               Modalities  Min:     IFC with      CP after exercises     MH after exercises            Other Therapeutic Activities: Pt was educated on PT POC, Diagnosis, Prognosis, pathomechanics as well as frequency and duration of scheduling future physical therapy appointments.  Time was also taken on this day to answer all patient questions and participation in PT. Reviewed appointment policy in detail with patient and patient verbalized understanding. Home Exercise Program: Patient was instructed in the following for HEP:     . Patient verbalized/demonstrated understanding and was issued written handout. HEP  Hams stretch, gastroc stretch 30 x 3, qs x 20, slr x 20, seated flex stretch 30 x 3      Therapeutic Exercise and NMR EXR  [] (92709) Provided verbal/tactile cueing for activities related to strengthening, flexibility, endurance, ROM for improvements in LE, proximal hip, and core control with self care, mobility, lifting, ambulation.  [] (58292) Provided verbal/tactile cueing for activities related to improving balance, coordination, kinesthetic sense, posture, motor skill, proprioception  to assist with LE, proximal hip, and core control in self care, mobility, lifting, ambulation and eccentric single leg control.      NMR and Therapeutic Activities:    [] (42051 or 27219) Provided verbal/tactile cueing for activities related to improving balance, coordination, kinesthetic sense, posture, motor skill, proprioception and motor activation to allow for proper function of core, proximal hip and LE with self care and ADLs and functional mobility.   [] (37294) Gait Re-education- Provided training and instruction to the patient for proper LE, core and proximal hip recruitment and positioning and eccentric body weight control with ambulation re-education including up and down stairs     Home Exercise Program:    [x] (92684) Reviewed/Progressed HEP activities related to strengthening, flexibility, endurance, ROM of core, proximal hip and LE for functional self-care, mobility, lifting and ambulation/stair navigation   [] (19053)Reviewed/Progressed HEP activities related to improving balance, coordination, kinesthetic sense, posture, motor skill, proprioception of core, proximal hip and LE for self care, mobility, lifting, and ambulation/stair navigation      Manual Treatments:  PROM / STM / Oscillations-Mobs:  G-I, II, III, IV (PA's, Inf., Post.)  [] (56093) Provided manual therapy to mobilize LE, proximal hip and/or LS spine soft tissue/joints for the purpose of modulating pain, promoting relaxation,  increasing ROM, reducing/eliminating soft tissue swelling/inflammation/restriction, improving soft tissue extensibility and allowing for proper ROM for normal function with self care, mobility, lifting and ambulation. If BWC Please Indicate Time In/Out  CPT Code Time in Time out                         Charges:  Timed Code Treatment Minutes: 45   Total Treatment Minutes: 45      [] EVAL (LOW) 90476 (typically 20 minutes face-to-face)  [] EVAL (MOD) 61028 (typically 30 minutes face-to-face)  [] EVAL (HIGH) 97434 (typically 45 minutes face-to-face)  [] RE-EVAL     [x] WJ(06190) x 2   [] Dry needle 1 or 2 Muscles (32453)  [] NMR (49022) x     [] Dry needle 3+ Muscles (37848)  [x] Manual (46392) x1     [] Ultrasound (18253) x  [] TA (88008) x     [] Mech Traction (99322)  [] ES(attended) (75361)     [] ES (un) (56702):   [] Vasopump (57372) [] Ionto (40685)   [] Other:    Jeffrey Ochoa:  Patient stated goal: \" I want to walk and function like normal \"  []? Progressing: []? Met: []? Not Met: []? Adjusted     Therapist goals for Patient:   Short Term Goals: To be achieved in: 2 weeks  1. Independent in HEP and progression per patient tolerance, in order to prevent re-injury. []? Progressing: []? Met: []? Not Met: []? Adjusted  2. Patient will have a decrease in pain by 25% to facilitate improvement in movement, function, and ADLs as indicated by Functional Deficits. []? Progressing: []? Met: []? Not Met: []? Adjusted     Long Term Goals: To be achieved in: 8 weeks  1. Disability index score of 25% or less for the LEFS to assist with reaching prior level of function. []? Progressing: []? Met: []? Not Met: []? Adjusted  2.  Patient will demonstrate increased AROM to 110, -5  to allow for proper joint functioning as indicated by patients Functional Deficits. []? Progressing: []? Met: []? Not Met: []? Adjusted  3. Patient will demonstrate an increase in Strength to good proximal hip strength and control, within 5lb HHD in LE to allow for proper functional mobility as indicated by patients Functional Deficits. []? Progressing: []? Met: []? Not Met: []? Adjusted  4. Patient will return togait with cane  functional activities without increased symptoms or restriction. []? Progressing: []? Met: []? Not Met: []? Adjusted  5. (patient specific functional goal)    []? Progressing: []? Met: []? Not Met: []? Adjusted         ASSESSMENT:  See eval    Treatment/Activity Tolerance:  [x] Patient tolerated treatment well [] Patient limited by fatique  [] Patient limited by pain  [] Patient limited by other medical complications  [] Other:     Overall Progression Towards Functional goals/ Treatment Progress Update:  [] Patient is progressing as expected towards functional goals listed. [] Progression is slowed due to complexities/Impairments listed. [] Progression has been slowed due to co-morbidities. [x] Plan just implemented, too soon to assess goals progression <30days   [] Goals require adjustment due to lack of progress  [] Patient is not progressing as expected and requires additional follow up with physician  [] Other    Prognosis for POC: [x] Good [] Fair  [] Poor    Patient requires continued skilled intervention: [x] Yes  [] No        PLAN: LE arom, prom  strength, proprioception, gait, balance, functional activities. Mfr, joint mobs, mods as needed, hep.  Progress as tolerated, rom, quads strength , end range flex and ext, progress to gait, gait with cane, balance and proprio, 12.22.20  Work on abduction strength, end range knee flex and ext    [] Continue per plan of care [] Alter current plan (see comments)  [x] Plan of care initiated [] Hold pending MD visit [] Discharge    Electronically signed by: Isabel Navarro PTA    Note: If patient does not return for scheduled/recommended follow up visits, this note will serve as a discharge from care along with the most recent update on progress. Anxiety    Depression    Esophageal disorder    GERD (gastroesophageal reflux disease)    Osteomyelitis  chronic sacral area followed at [facility]  Paraplegia  s/p fall 2007  Self-catheterizes urinary bladder  via umbilicous mitroffanoff 6-8 times a day

## 2021-01-05 ENCOUNTER — HOSPITAL ENCOUNTER (OUTPATIENT)
Dept: PHYSICAL THERAPY | Age: 61
Setting detail: THERAPIES SERIES
Discharge: HOME | End: 2021-01-05
Payer: COMMERCIAL

## 2021-01-05 PROCEDURE — 97140 MANUAL THERAPY 1/> REGIONS: CPT

## 2021-01-05 PROCEDURE — 97110 THERAPEUTIC EXERCISES: CPT

## 2021-01-05 PROCEDURE — 97112 NEUROMUSCULAR REEDUCATION: CPT

## 2021-01-05 NOTE — FLOWSHEET NOTE
[facility] - Outpatient Rehabilitation & Therapy  [address]. [place]  Phone: [phone]   Fax:     [phone]      Physical Therapy Treatment Note/ Progress Report:     Date:  2021    Patient Name:  Claudene Simons    :  1960  MRN: [id number]    Pertinent Medical History:Additional Pertinent Hx: HTN    Medical/Treatment Diagnosis Information:  · Diagnosis: Left Distal Femoral Ccuxjslq65/4/20  · Treatment Diagnosis: decreased abilty to a,mulate and function    Insurance/Certification information:  PT Insurance Information: [employer]  Physician Information:  Referring Practitioner: Dr. Foster Strange of care signed (Y/N): routed    Date of Patient follow up with Physician:      Progress Report: []  Yes  [x]  No     Date Range for reporting period:  Beginnin2021  Ending:      Progress report due (10 Rx/or 30 days whichever is less):      Recertification due (POC duration/ or 90 days whichever is less): 3/10/20     Visit # POC/Insurance Allowable Auth Needed   7 30 []Yes   []No     Latex Allergy:  [x]NO      []YES  Preferred Language for Healthcare:   [x]English       []Other:    Functional Scale:      Date assessed: at eval  Test: LEFS-22  Score:    Pain level:  5/10     History of Injury: Patient is a 62 y/o female who fell in the shower and fx her distal femur on 20 with a resultant surgery on 20. She was seen at home for PT up until last week. She c/o moderate pain at this time. She wakes from pain and is limited with all activities. She is ambulating with a walker and a rolling walker and I fwb with the walker. She works a a  and sits most of the day. She hoeps to return to full function. She did fall last week at home. She had a Retrograde intramedullary rodding procedure to fix it. SUBJECTIVE:  20 Patient reports soreness on lateral hip area.   20  Pt states, \" I am sore in my hip, concerned about the pain \"  12/24/20  Pt states, \" doing ok \"  12/29/20  Pt states, \" seems to be getting stronger \"  12/31/20 Patient reports soreness is minimal just stiffness.   1/5/20  Pt states, \" doing a little better \"    OBJECTIVE:  ROM LEFT    HIP Flex 80    HIP Abd 15     HIP Ext 5     HIP IR 15     HIP ER 10     Knee ext -15     Knee Flex 90     Ankle PF wfl     Ankle DF 5     Ankle In       Ankle Ev       Strength  LEFT RIGHT   HIP Flexors 4-     HIP Abductors 4-     HIP Ext 4-     Hip ER 4-     Knee EXT (quad) 3     Knee Flex (HS) 3+-     Ankle DF 4+     Ankle PF 4+     Ankle Inv 4+     Ankle EV 4+             Circumference  Mid apex  7 cm prox    60  67                  RESTRICTIONS/PRECAUTIONS: fwb in walker    Exercises/Interventions:     Therapeutic Ex (05652)   Min: Reps/Resistance Notes/CUES   Nu step seat 9 L3  5 min    Leg press 80 # 2 x 20, left 50# x 30    ppt With ball x 30    qs 5 sec x x 2  Min on towel    bridges X 30    Seated hams stretch 30 x 3    slr X 30    laq/saq 5 # 20 x each    Piriformis stretch 30 x 3 , gentle manual    clams X 30 with orange         Sl abd X 30    Standing abd X 30 slow, right x 20         abd with tband                         Gait with cane     Gait in bars X 2 min sliding    incline 3 x 30 sec     hss 60 x 3    HEP                         Manual Intervention (49840)  Min: Mfr to left itb, gluts, piriformis    Knee mobs/PROM Gr 3    Tib/Fem Mobs Gr 3    Patella Mobs gr3    Ankle mobs Prom flex and ext              NMR re-education (56385)  Min:  CUES NEEDED   wobble X 2 min ea    Step up 3\" x 30    Semi squat X 30         Step thru X 3 min    Therapeutic Activity (48590)  Min:               Modalities  Min:     IFC with      CP after exercises     MH after exercises            Other Therapeutic Activities: Pt was educated on PT POC, Diagnosis, Prognosis, pathomechanics as well as frequency and duration of scheduling future physical therapy appointments. Time was also taken on this day to answer all patient questions and participation in PT. Reviewed appointment policy in detail with patient and patient verbalized understanding. Home Exercise Program: Patient was instructed in the following for HEP:     . Patient verbalized/demonstrated understanding and was issued written handout. HEP  Hams stretch, gastroc stretch 30 x 3, qs x 20, slr x 20, seated flex stretch 30 x 3      Therapeutic Exercise and NMR EXR  [] (75616) Provided verbal/tactile cueing for activities related to strengthening, flexibility, endurance, ROM for improvements in LE, proximal hip, and core control with self care, mobility, lifting, ambulation.  [] (77889) Provided verbal/tactile cueing for activities related to improving balance, coordination, kinesthetic sense, posture, motor skill, proprioception  to assist with LE, proximal hip, and core control in self care, mobility, lifting, ambulation and eccentric single leg control.      NMR and Therapeutic Activities:    [] (86752 or 30184) Provided verbal/tactile cueing for activities related to improving balance, coordination, kinesthetic sense, posture, motor skill, proprioception and motor activation to allow for proper function of core, proximal hip and LE with self care and ADLs and functional mobility.   [] (23107) Gait Re-education- Provided training and instruction to the patient for proper LE, core and proximal hip recruitment and positioning and eccentric body weight control with ambulation re-education including up and down stairs     Home Exercise Program:    [x] (42948) Reviewed/Progressed HEP activities related to strengthening, flexibility, endurance, ROM of core, proximal hip and LE for functional self-care, mobility, lifting and ambulation/stair navigation   [] (86149)Reviewed/Progressed HEP activities related to improving balance, coordination, kinesthetic sense, posture, motor skill, proprioception of core, proximal hip and LE for self care, mobility, lifting, and ambulation/stair navigation      Manual Treatments:  PROM / STM / Oscillations-Mobs:  G-I, II, III, IV (PA's, Inf., Post.)  [] (48150) Provided manual therapy to mobilize LE, proximal hip and/or LS spine soft tissue/joints for the purpose of modulating pain, promoting relaxation,  increasing ROM, reducing/eliminating soft tissue swelling/inflammation/restriction, improving soft tissue extensibility and allowing for proper ROM for normal function with self care, mobility, lifting and ambulation. If BW Please Indicate Time In/Out  CPT Code Time in Time out                         Charges:  Timed Code Treatment Minutes: 45   Total Treatment Minutes: 45      [] EVAL (LOW) 61630 (typically 20 minutes face-to-face)  [] EVAL (MOD) 71938 (typically 30 minutes face-to-face)  [] EVAL (HIGH) 94631 (typically 45 minutes face-to-face)  [] RE-EVAL     [x] WV(58460) x 2   [] Dry needle 1 or 2 Muscles (36469)  [] NMR (87665) x     [] Dry needle 3+ Muscles (08068)  [x] Manual (69963) x1     [] Ultrasound (28819) x  [] TA (03756) x     [] Mech Traction (46667)  [] ES(attended) (83751)     [] ES (un) (66480):   [] Vasopump (80015) [] Ionto (94020)   [] Other:    Nia Lees:  Patient stated goal: \" I want to walk and function like normal \"  []? Progressing: []? Met: []? Not Met: []? Adjusted     Therapist goals for Patient:   Short Term Goals: To be achieved in: 2 weeks  1. Independent in HEP and progression per patient tolerance, in order to prevent re-injury. []? Progressing: []? Met: []? Not Met: []? Adjusted  2. Patient will have a decrease in pain by 25% to facilitate improvement in movement, function, and ADLs as indicated by Functional Deficits. []? Progressing: []? Met: []? Not Met: []? Adjusted     Long Term Goals: To be achieved in: 8 weeks  1. Disability index score of 25% or less for the LEFS to assist with reaching prior level of function. []?  Progressing: []? Met: []? Not Met: []? Adjusted  2. Patient will demonstrate increased AROM to 110, -5  to allow for proper joint functioning as indicated by patients Functional Deficits. []? Progressing: []? Met: []? Not Met: []? Adjusted  3. Patient will demonstrate an increase in Strength to good proximal hip strength and control, within 5lb HHD in LE to allow for proper functional mobility as indicated by patients Functional Deficits. []? Progressing: []? Met: []? Not Met: []? Adjusted  4. Patient will return togait with cane  functional activities without increased symptoms or restriction. []? Progressing: []? Met: []? Not Met: []? Adjusted  5. (patient specific functional goal)    []? Progressing: []? Met: []? Not Met: []? Adjusted         ASSESSMENT:  See eval    Treatment/Activity Tolerance:  [x] Patient tolerated treatment well [] Patient limited by fatique  [] Patient limited by pain  [] Patient limited by other medical complications  [] Other:     Overall Progression Towards Functional goals/ Treatment Progress Update:  [] Patient is progressing as expected towards functional goals listed. [] Progression is slowed due to complexities/Impairments listed. [] Progression has been slowed due to co-morbidities. [x] Plan just implemented, too soon to assess goals progression <30days   [] Goals require adjustment due to lack of progress  [] Patient is not progressing as expected and requires additional follow up with physician  [] Other    Prognosis for POC: [x] Good [] Fair  [] Poor    Patient requires continued skilled intervention: [x] Yes  [] No        PLAN: LE arom, prom  strength, proprioception, gait, balance, functional activities. Mfr, joint mobs, mods as needed, hep.  Progress as tolerated, rom, quads strength , end range flex and ext, progress to gait, gait with cane, balance and proprio, 12.22.20  Work on abduction strength, end range knee flex and ext    [] Continue per plan of care [] Alter current

## 2021-01-07 ENCOUNTER — HOSPITAL ENCOUNTER (OUTPATIENT)
Dept: PHYSICAL THERAPY | Age: 61
Setting detail: THERAPIES SERIES
Discharge: HOME | End: 2021-01-07
Payer: COMMERCIAL

## 2021-01-07 NOTE — FLOWSHEET NOTE
Physical Therapy  Cancellation/No-show Note  Patient Name:  Kei Hernández  :  1960   Date:  2021  Cancelled visits to date: 1  No-shows to date: 0    For today's appointment patient:  [x]  Cancelled  []  Rescheduled appointment  []  No-show     Reason given by patient:  []  Patient ill  []  Conflicting appointment  []  No transportation    []  Conflict with work  []  No reason given  []  Other:     Comments:      Electronically signed by:  Licha Kitchen PT

## 2021-01-12 ENCOUNTER — HOSPITAL ENCOUNTER (OUTPATIENT)
Dept: PHYSICAL THERAPY | Age: 61
Setting detail: THERAPIES SERIES
Discharge: HOME | End: 2021-01-12
Payer: COMMERCIAL

## 2021-01-12 PROCEDURE — 97140 MANUAL THERAPY 1/> REGIONS: CPT

## 2021-01-12 PROCEDURE — 97112 NEUROMUSCULAR REEDUCATION: CPT

## 2021-01-12 PROCEDURE — 97110 THERAPEUTIC EXERCISES: CPT

## 2021-01-12 NOTE — FLOWSHEET NOTE
[facility] - Outpatient Rehabilitation & Therapy  [address]. [place]  Phone: [phone]   Fax:     [phone]      Physical Therapy Treatment Note/ Progress Report:     Date:  2021    Patient Name:  Kemi Joyner    :  1960  MRN: [id number]    Pertinent Medical History:Additional Pertinent Hx: HTN    Medical/Treatment Diagnosis Information:  · Diagnosis: Left Distal Femoral Agemvjsr86/4/20  · Treatment Diagnosis: decreased abilty to a,mulate and function    Insurance/Certification information:  PT Insurance Information: [facility]  Physician Information:  Referring Practitioner: Dr. Edie Farr of care signed (Y/N): routed    Date of Patient follow up with Physician:      Progress Report: []  Yes  [x]  No     Date Range for reporting period:  Beginnin2021  Ending:      Progress report due (10 Rx/or 30 days whichever is less):      Recertification due (POC duration/ or 90 days whichever is less): 3/10/20     Visit # POC/Insurance Allowable Auth Needed   8 30 []Yes   []No     Latex Allergy:  [x]NO      []YES  Preferred Language for Healthcare:   [x]English       []Other:    Functional Scale:      Date assessed: at eval  Test: LEFS-22  Score:    Pain level:  410     History of Injury: Patient is a 64 y/o female who fell in the shower and fx her distal femur on 20 with a resultant surgery on 20. She was seen at home for PT up until last week. She c/o moderate pain at this time. She wakes from pain and is limited with all activities. She is ambulating with a walker and a rolling walker and I fwb with the walker. She works a a  and sits most of the day. She hoeps to return to full function. She did fall last week at home. She had a Retrograde intramedullary rodding procedure to fix it. SUBJECTIVE:  20 Patient reports soreness on lateral hip area.   20  Pt states, \" I am sore in my hip, concerned about the pain \"  12/24/20  Pt states, \" doing ok \"  12/29/20  Pt states, \" seems to be getting stronger \"  12/31/20 Patient reports soreness is minimal just stiffness.   1/5/20  Pt states, \" doing a little better \"  1/12/21   Pt states, \" walking around my house with the cane more \"    OBJECTIVE:  ROM LEFT    HIP Flex 80    HIP Abd 15     HIP Ext 5     HIP IR 15     HIP ER 10     Knee ext -15     Knee Flex 90     Ankle PF wfl     Ankle DF 5     Ankle In       Ankle Ev       Strength  LEFT RIGHT   HIP Flexors 4-     HIP Abductors 4-     HIP Ext 4-     Hip ER 4-     Knee EXT (quad) 3     Knee Flex (HS) 3+-     Ankle DF 4+     Ankle PF 4+     Ankle Inv 4+     Ankle EV 4+             Circumference  Mid apex  7 cm prox    60  67                  RESTRICTIONS/PRECAUTIONS: fwb in walker    Exercises/Interventions:     Therapeutic Ex (79980)   Min: Reps/Resistance Notes/CUES   Nu step seat 9 L3  5 min    Leg press 80 # 2 x 20, left 60# x 30    ppt With ball x 30    qs 5 sec x x 2  Min on towel    bridges X 30    Seated hams stretch 30 x 3    slr X 30    laq/saq 5 # 20 x each    Piriformis stretch 30 x 3 , gentle manual    clams X 30 with orange         Sl abd X 30    Standing abd X 30 slow, right x 20    Standing ext X 2 min    abd with tband                         Gait with cane     Gait in bars X 2 min sliding    incline 3 x 30 sec     hss 60 x 3    HEP                         Manual Intervention (81757)  Min: Mfr to left itb, gluts, piriformis    Knee mobs/PROM Gr 4    Tib/Fem Mobs Gr4    Patella Mobs gr4    Ankle mobs Prom flex and ext    Ext overpressure 10 sec x 30         NMR re-education (01816)  Min:  CUES NEEDED   wobble X 3 min ea    Step up 3\" x 30    Semi squat X 30    Sit to stand slowly     Semi lunge     ecc squat     Step thru X 2 min         Step thru X 3 min    Therapeutic Activity (77217)  Min:               Modalities  Min:     IFC with      CP after exercises      after exercises            Other Therapeutic Activities: Pt was educated on PT POC, Diagnosis, Prognosis, pathomechanics as well as frequency and duration of scheduling future physical therapy appointments. Time was also taken on this day to answer all patient questions and participation in PT. Reviewed appointment policy in detail with patient and patient verbalized understanding. Home Exercise Program: Patient was instructed in the following for HEP:     . Patient verbalized/demonstrated understanding and was issued written handout. HEP  Hams stretch, gastroc stretch 30 x 3, qs x 20, slr x 20, seated flex stretch 30 x 3      Therapeutic Exercise and NMR EXR  [] (79421) Provided verbal/tactile cueing for activities related to strengthening, flexibility, endurance, ROM for improvements in LE, proximal hip, and core control with self care, mobility, lifting, ambulation.  [] (18498) Provided verbal/tactile cueing for activities related to improving balance, coordination, kinesthetic sense, posture, motor skill, proprioception  to assist with LE, proximal hip, and core control in self care, mobility, lifting, ambulation and eccentric single leg control.      NMR and Therapeutic Activities:    [] (52469 or 77929) Provided verbal/tactile cueing for activities related to improving balance, coordination, kinesthetic sense, posture, motor skill, proprioception and motor activation to allow for proper function of core, proximal hip and LE with self care and ADLs and functional mobility.   [] (25843) Gait Re-education- Provided training and instruction to the patient for proper LE, core and proximal hip recruitment and positioning and eccentric body weight control with ambulation re-education including up and down stairs     Home Exercise Program:    [x] (78074) Reviewed/Progressed HEP activities related to strengthening, flexibility, endurance, ROM of core, proximal hip and LE for functional self-care, mobility, lifting and ambulation/stair navigation   [] (53475)Reviewed/Progressed HEP activities related to improving balance, coordination, kinesthetic sense, posture, motor skill, proprioception of core, proximal hip and LE for self care, mobility, lifting, and ambulation/stair navigation      Manual Treatments:  PROM / STM / Oscillations-Mobs:  G-I, II, III, IV (PA's, Inf., Post.)  [] (87141) Provided manual therapy to mobilize LE, proximal hip and/or LS spine soft tissue/joints for the purpose of modulating pain, promoting relaxation,  increasing ROM, reducing/eliminating soft tissue swelling/inflammation/restriction, improving soft tissue extensibility and allowing for proper ROM for normal function with self care, mobility, lifting and ambulation. If BW Please Indicate Time In/Out  CPT Code Time in Time out                         Charges:  Timed Code Treatment Minutes: 45   Total Treatment Minutes: 45      [] EVAL (LOW) 23878 (typically 20 minutes face-to-face)  [] EVAL (MOD) 93494 (typically 30 minutes face-to-face)  [] EVAL (HIGH) 06830 (typically 45 minutes face-to-face)  [] RE-EVAL     [x] XM(75269) x 1   [] Dry needle 1 or 2 Muscles (38334)  [x] NMR (73577) x 1    [] Dry needle 3+ Muscles (92222)  [x] Manual (64024) x1     [] Ultrasound (40122) x  [] TA (18928) x     [] Mech Traction (36759)  [] ES(attended) (21290)     [] ES (un) (67168):   [] Vasopump (76504) [] Ionto (04422)   [] Other:    Jeffrey Ochoa:  Patient stated goal: \" I want to walk and function like normal \"  []? Progressing: []? Met: []? Not Met: []? Adjusted     Therapist goals for Patient:   Short Term Goals: To be achieved in: 2 weeks  1. Independent in HEP and progression per patient tolerance, in order to prevent re-injury. []? Progressing: []? Met: []? Not Met: []? Adjusted  2. Patient will have a decrease in pain by 25% to facilitate improvement in movement, function, and ADLs as indicated by Functional Deficits. []? Progressing: []?  Met: []? Not Met: []? Adjusted     Long Term Goals: To be achieved in: 8 weeks  1. Disability index score of 25% or less for the LEFS to assist with reaching prior level of function. []? Progressing: []? Met: []? Not Met: []? Adjusted  2. Patient will demonstrate increased AROM to 110, -5  to allow for proper joint functioning as indicated by patients Functional Deficits. []? Progressing: []? Met: []? Not Met: []? Adjusted  3. Patient will demonstrate an increase in Strength to good proximal hip strength and control, within 5lb HHD in LE to allow for proper functional mobility as indicated by patients Functional Deficits. []? Progressing: []? Met: []? Not Met: []? Adjusted  4. Patient will return togait with cane  functional activities without increased symptoms or restriction. []? Progressing: []? Met: []? Not Met: []? Adjusted  5. (patient specific functional goal)    []? Progressing: []? Met: []? Not Met: []? Adjusted         ASSESSMENT:  See eval    Treatment/Activity Tolerance:  [x] Patient tolerated treatment well [] Patient limited by fatique  [] Patient limited by pain  [] Patient limited by other medical complications  [x] Other: 1/12/21  Pt progressing but is still weak and lacking ext    Overall Progression Towards Functional goals/ Treatment Progress Update:  [] Patient is progressing as expected towards functional goals listed. [] Progression is slowed due to complexities/Impairments listed. [] Progression has been slowed due to co-morbidities. [x] Plan just implemented, too soon to assess goals progression <30days   [] Goals require adjustment due to lack of progress  [] Patient is not progressing as expected and requires additional follow up with physician  [] Other    Prognosis for POC: [x] Good [] Fair  [] Poor    Patient requires continued skilled intervention: [x] Yes  [] No        PLAN: LE arom, prom  strength, proprioception, gait, balance, functional activities.   Mfr, joint mobs, mods as needed, hep. Progress as tolerated, rom, quads strength , end range flex and ext, progress to gait, gait with cane, balance and proprio, 12.22.20  Work on abduction strength, end range knee flex and ext. Increase ext exs in standing    [] Continue per plan of care [] Alter current plan (see comments)  [x] Plan of care initiated [] Hold pending MD visit [] Discharge    Electronically signed by: Jennifer Tam PT    Note: If patient does not return for scheduled/recommended follow up visits, this note will serve as a discharge from care along with the most recent update on progress.

## 2021-01-14 ENCOUNTER — APPOINTMENT (OUTPATIENT)
Dept: PHYSICAL THERAPY | Age: 61
End: 2021-01-14
Payer: COMMERCIAL

## 2021-01-21 ENCOUNTER — APPOINTMENT (OUTPATIENT)
Dept: PHYSICAL THERAPY | Age: 61
End: 2021-01-21
Payer: COMMERCIAL

## 2021-01-28 ENCOUNTER — HOSPITAL ENCOUNTER (OUTPATIENT)
Dept: PHYSICAL THERAPY | Age: 61
Setting detail: THERAPIES SERIES
Discharge: HOME | End: 2021-01-28
Payer: COMMERCIAL

## 2021-01-28 PROCEDURE — 97110 THERAPEUTIC EXERCISES: CPT

## 2021-01-28 PROCEDURE — 97140 MANUAL THERAPY 1/> REGIONS: CPT

## 2021-01-28 PROCEDURE — 97112 NEUROMUSCULAR REEDUCATION: CPT

## 2021-01-28 NOTE — FLOWSHEET NOTE
[facility] - Outpatient Rehabilitation & Therapy  [address]. [place]  Phone: [phone]   Fax:     [phone]      Physical Therapy Treatment Note/ Progress Report:     Date:  2021    Patient Name:  Sabrina Yates    :  1960  MRN: [id number]    Pertinent Medical History:Additional Pertinent Hx: HTN    Medical/Treatment Diagnosis Information:  · Diagnosis: Left Distal Femoral Boeumdra66/4/20  · Treatment Diagnosis: decreased abilty to a,mulate and function    Insurance/Certification information:  PT Insurance Information: [employer]  Physician Information:  Referring Practitioner: Dr. Anna Mac of care signed (Y/N): routed    Date of Patient follow up with Physician:      Progress Report: []  Yes  [x]  No     Date Range for reporting period:  Beginnin2021  Ending:      Progress report due (10 Rx/or 30 days whichever is less):      Recertification due (POC duration/ or 90 days whichever is less): 3/10/20     Visit # POC/Insurance Allowable Auth Needed   9 30 []Yes   []No     Latex Allergy:  [x]NO      []YES  Preferred Language for Healthcare:   [x]English       []Other:    Functional Scale:      Date assessed: at eval  Test: LEFS-22  Score:    Pain level:  4/10     History of Injury: Patient is a 64 y/o female who fell in the shower and fx her distal femur on 20 with a resultant surgery on 20. She was seen at home for PT up until last week. She c/o moderate pain at this time. She wakes from pain and is limited with all activities. She is ambulating with a walker and a rolling walker and I fwb with the walker. She works a a  and sits most of the day. She hoeps to return to full function. She did fall last week at home. She had a Retrograde intramedullary rodding procedure to fix it. SUBJECTIVE:  20 Patient reports soreness on lateral hip area.   20  Pt states, \" I am sore in my hip, concerned about the pain \"  12/24/20  Pt states, \" doing ok \"  12/29/20  Pt states, \" seems to be getting stronger \"  12/31/20 Patient reports soreness is minimal just stiffness. 1/5/20  Pt states, \" doing a little better \"  1/12/21   Pt states, \" walking around my house with the cane more \"  01/28/20 Patient reports she is walking more with a cane. States just soreness in her quad and around the knee cap.     OBJECTIVE:  ROM LEFT    HIP Flex 80    HIP Abd 15     HIP Ext 5     HIP IR 15     HIP ER 10     Knee ext -15     Knee Flex 90     Ankle PF wfl     Ankle DF 5     Ankle In       Ankle Ev       Strength  LEFT RIGHT   HIP Flexors 4-     HIP Abductors 4-     HIP Ext 4-     Hip ER 4-     Knee EXT (quad) 3     Knee Flex (HS) 3+-     Ankle DF 4+     Ankle PF 4+     Ankle Inv 4+     Ankle EV 4+             Circumference  Mid apex  7 cm prox    60  67                  RESTRICTIONS/PRECAUTIONS: fwb in walker    Exercises/Interventions:     Therapeutic Ex (76953)   Min: Reps/Resistance Notes/CUES   Nu step seat 9 L3  5 min    Leg press 80 # 2 x 20, left 60# x 30    ppt With ball x 30    qs 5 sec x  2  Min on towel    bridges X 30    Seated hams stretch 30 x 3    slr X 30    laq/saq 5 # 20 x each    Piriformis stretch 30 x 3 , gentle manual    clams X 30 with orange         Sl abd X 30    Standing abd X 30 slow, right x 20    Standing ext X 2 min    abd with tband                         Gait with cane     Gait in bars X 2 min sliding    incline 3 x 30 sec     hss 60 x 3    HEP                         Manual Intervention (08091)  Min: Mfr to left itb, gluts, piriformis    Knee mobs/PROM Gr 4    Tib/Fem Mobs Gr4    Patella Mobs gr4    Ankle mobs Prom flex and ext    Ext overpressure 10 sec x 30         NMR re-education (41812)  Min:  CUES NEEDED   wobble X 3 min ea    Step up 3\" x 20    Semi squat X 30    Sit to stand slowly     Semi lunge     ecc squat     Step thru X 2 min         Step thru X 3 min Therapeutic Activity (65076)  Min:               Modalities  Min:     IFC with      CP after exercises     MH after exercises            Other Therapeutic Activities: Pt was educated on PT POC, Diagnosis, Prognosis, pathomechanics as well as frequency and duration of scheduling future physical therapy appointments. Time was also taken on this day to answer all patient questions and participation in PT. Reviewed appointment policy in detail with patient and patient verbalized understanding. Home Exercise Program: Patient was instructed in the following for HEP:     . Patient verbalized/demonstrated understanding and was issued written handout. HEP  Hams stretch, gastroc stretch 30 x 3, qs x 20, slr x 20, seated flex stretch 30 x 3      Therapeutic Exercise and NMR EXR  [] (32081) Provided verbal/tactile cueing for activities related to strengthening, flexibility, endurance, ROM for improvements in LE, proximal hip, and core control with self care, mobility, lifting, ambulation.  [] (51348) Provided verbal/tactile cueing for activities related to improving balance, coordination, kinesthetic sense, posture, motor skill, proprioception  to assist with LE, proximal hip, and core control in self care, mobility, lifting, ambulation and eccentric single leg control.      NMR and Therapeutic Activities:    [] (90413 or 31359) Provided verbal/tactile cueing for activities related to improving balance, coordination, kinesthetic sense, posture, motor skill, proprioception and motor activation to allow for proper function of core, proximal hip and LE with self care and ADLs and functional mobility.   [] (75220) Gait Re-education- Provided training and instruction to the patient for proper LE, core and proximal hip recruitment and positioning and eccentric body weight control with ambulation re-education including up and down stairs     Home Exercise Program:    [x] (35392) Reviewed/Progressed HEP activities related to strengthening, flexibility, endurance, ROM of core, proximal hip and LE for functional self-care, mobility, lifting and ambulation/stair navigation   [] (01287)Reviewed/Progressed HEP activities related to improving balance, coordination, kinesthetic sense, posture, motor skill, proprioception of core, proximal hip and LE for self care, mobility, lifting, and ambulation/stair navigation      Manual Treatments:  PROM / STM / Oscillations-Mobs:  G-I, II, III, IV (PA's, Inf., Post.)  [] (50267) Provided manual therapy to mobilize LE, proximal hip and/or LS spine soft tissue/joints for the purpose of modulating pain, promoting relaxation,  increasing ROM, reducing/eliminating soft tissue swelling/inflammation/restriction, improving soft tissue extensibility and allowing for proper ROM for normal function with self care, mobility, lifting and ambulation. If BWC Please Indicate Time In/Out  CPT Code Time in Time out                         Charges:  Timed Code Treatment Minutes: 45   Total Treatment Minutes: 45      [] EVAL (LOW) 78316 (typically 20 minutes face-to-face)  [] EVAL (MOD) 09419 (typically 30 minutes face-to-face)  [] EVAL (HIGH) 49187 (typically 45 minutes face-to-face)  [] RE-EVAL     [x] ZU(02476) x 1   [] Dry needle 1 or 2 Muscles (71129)  [x] NMR (99935) x 1    [] Dry needle 3+ Muscles (63418)  [x] Manual (08543) x1     [] Ultrasound (53438) x  [] TA (98226) x     [] Mech Traction (37961)  [] ES(attended) (74778)     [] ES (un) (05595):   [] Vasopump (39749) [] Ionto (39247)   [] Other:    Niharika Choi:  Patient stated goal: \" I want to walk and function like normal \"  []? Progressing: []? Met: []? Not Met: []? Adjusted     Therapist goals for Patient:   Short Term Goals: To be achieved in: 2 weeks  1. Independent in HEP and progression per patient tolerance, in order to prevent re-injury. []? Progressing: []? Met: []? Not Met: []? Adjusted  2.  Patient will have a decrease in pain by 25% to facilitate improvement in movement, function, and ADLs as indicated by Functional Deficits. []? Progressing: []? Met: []? Not Met: []? Adjusted     Long Term Goals: To be achieved in: 8 weeks  1. Disability index score of 25% or less for the LEFS to assist with reaching prior level of function. []? Progressing: []? Met: []? Not Met: []? Adjusted  2. Patient will demonstrate increased AROM to 110, -5  to allow for proper joint functioning as indicated by patients Functional Deficits. []? Progressing: []? Met: []? Not Met: []? Adjusted  3. Patient will demonstrate an increase in Strength to good proximal hip strength and control, within 5lb HHD in LE to allow for proper functional mobility as indicated by patients Functional Deficits. []? Progressing: []? Met: []? Not Met: []? Adjusted  4. Patient will return togait with cane  functional activities without increased symptoms or restriction. []? Progressing: []? Met: []? Not Met: []? Adjusted  5. (patient specific functional goal)    []? Progressing: []? Met: []? Not Met: []? Adjusted         ASSESSMENT:  See eval    Treatment/Activity Tolerance:  [x] Patient tolerated treatment well [] Patient limited by fatique  [] Patient limited by pain  [] Patient limited by other medical complications  [x] Other: 1/12/21  Pt progressing but is still weak and lacking ext    Overall Progression Towards Functional goals/ Treatment Progress Update:  [] Patient is progressing as expected towards functional goals listed. [] Progression is slowed due to complexities/Impairments listed. [] Progression has been slowed due to co-morbidities.   [x] Plan just implemented, too soon to assess goals progression <30days   [] Goals require adjustment due to lack of progress  [] Patient is not progressing as expected and requires additional follow up with physician  [] Other    Prognosis for POC: [x] Good [] Fair  [] Poor    Patient requires continued skilled intervention: [x] Yes  []

## 2021-02-02 ENCOUNTER — HOSPITAL ENCOUNTER (OUTPATIENT)
Dept: PHYSICAL THERAPY | Age: 61
Setting detail: THERAPIES SERIES
Discharge: HOME | End: 2021-02-02
Payer: COMMERCIAL

## 2021-02-02 PROCEDURE — 97110 THERAPEUTIC EXERCISES: CPT

## 2021-02-02 PROCEDURE — 97140 MANUAL THERAPY 1/> REGIONS: CPT

## 2021-02-02 PROCEDURE — 97112 NEUROMUSCULAR REEDUCATION: CPT

## 2021-02-02 NOTE — FLOWSHEET NOTE
Physical Therapy Re-Certification Plan of Care/MD UPDATE      Dear  ,Dr. Poly Montgomery     We had the pleasure of treating the following patient for physical therapy services at [facility]. A summary of our findings can be found in the updated assessment below. This includes our plan of care. If you have any questions or concerns regarding these findings, please do not hesitate to contact me at the office phone number checked above. Thank you for the referral.     Physician Signature:________________________________Date:__________________  By signing above (or electronic signature), therapists plan is approved by physician    Date Range Of Visits: 12/10-  Total Visits to Date: 8  Overall Response to Treatment:   [x]Patient is responding well to treatment and improvement is noted with regards  to goals   []Patient should continue to improve in reasonable time if they continue HEP   []Patient has plateaued and is no longer responding to skilled PT intervention    []Patient is getting worse and would benefit from return to referring MD   []Patient unable to adhere to initial POC   []Other:       Recommendation:    [x]Continue PT 2x / wk [id number]. []Hold PT, pending MD visit            -                                                 [facility]  [address]  Phone: [phone]   Fax:     [phone]      Physical Therapy Treatment Note/ Progress Report:     Date:  2021    Patient Name:  Kemi Joyner    :  1960  MRN: [id number]    Pertinent Medical History:Additional Pertinent Hx: HTN    Medical/Treatment Diagnosis Information:  · Diagnosis: Left Distal Femoral Dkzkiedr81/4/20  · Treatment Diagnosis: decreased abilty to a,mulate and function    Insurance/Certification information:  PT Insurance Information: [facility]  Physician Information:  Referring Practitioner: Dr. Edie Farr of care signed (Y/N): routed    Date of Patient follow up with Physician:      Progress Report: []  Yes  [x]  No     Date Range for reporting period:  Beginnin2021  Ending:      Progress report due (10 Rx/or 30 days whichever is less):      Recertification due (POC duration/ or 90 days whichever is less): 3/10/20     Visit # POC/Insurance Allowable Auth Needed   10 30 []Yes   []No     Latex Allergy:  [x]NO      []YES  Preferred Language for Healthcare:   [x]English       []Other:    Functional Scale:      Date assessed: 21  Test: LEFS-44  Score:    Pain level:  3/10     History of Injury: Patient is a 64 y/o female who fell in the shower and fx her distal femur on 20 with a resultant surgery on 20. She was seen at home for PT up until last week. She c/o moderate pain at this time. She wakes from pain and is limited with all activities. She is ambulating with a walker and a rolling walker and I fwb with the walker. She works a a  and sits most of the day. She hoeps to return to full function. She did fall last week at home. She had a Retrograde intramedullary rodding procedure to fix it. SUBJECTIVE:  20 Patient reports soreness on lateral hip area. 20  Pt states, \" I am sore in my hip, concerned about the pain \"  20  Pt states, \" doing ok \"  20  Pt states, \" seems to be getting stronger \"  20 Patient reports soreness is minimal just stiffness. 20  Pt states, \" doing a little better \"  21   Pt states, \" walking around my house with the cane more \"  20 Patient reports she is walking more with a cane. States just soreness in her quad and around the knee cap.   21  Pt states, \" I am trying to work on it at home \"    OBJECTIVE:  ROM LEFT 21   HIP Flex 80 95   HIP Abd 15 25    HIP Ext 5 10    HIP IR 15 25   HIP ER 10 30    Knee ext -15 -7    Knee Flex 90 110    Ankle PF wfl     Ankle DF 5     Ankle In       Ankle Ev     Strength  LEFT 2/2/21   HIP Flexors 4-  4   HIP Abductors 4-  4   HIP Ext 4-  4   Hip ER 4-  4   Knee EXT (quad) 3 4    Knee Flex (HS) 3+-  4   Ankle DF 4+  5   Ankle PF 4+  5   Ankle Inv 4+  5   Ankle EV 4+  5           Circumference  Mid apex  7 cm prox    60  67                  RESTRICTIONS/PRECAUTIONS: fwb in walker    Exercises/Interventions:     Therapeutic Ex (82805)   Min: Reps/Resistance Notes/CUES   Nu step seat 9 L3  5 min    Leg press 90 # 2 x 20, left 60# x 30    ppt With ball x 30    qs 5 sec x  2  Min on towel    bridges X 30    Seated hams stretch 30 x 3    slr X 30         Piriformis stretch 30 x 3 , gentle manual    clams X 30 with orange         Sl abd / 30 X 30    Standing abd X 30 slow, right x 20         abd with tband     Hip 4 way 3k x 30    Lat step up 4\" x 30              Gait with cane     Gait in bars X 2 min sliding    incline 3 x 30 sec     hss 60 x 3    HEP                         Manual Intervention (18517)  Min: Mfr to left itb, gluts, piriformis    Knee mobs/PROM Gr 4    Tib/Fem Mobs Gr4    Patella Mobs gr4    Ankle mobs Prom flex and ext    Ext overpressure 10 sec x 30         NMR re-education (11861)  Min:  CUES NEEDED   wobble X 3 min ea    Step up 3\" x 20    Semi squat X 30    Sit to stand slowly     Semi lunge     ecc squat     Step thru X 2 min         Step thru X 3 min    Therapeutic Activity (91107)  Min:               Modalities  Min:     IFC with      CP after exercises     MH after exercises            Other Therapeutic Activities: Pt was educated on PT POC, Diagnosis, Prognosis, pathomechanics as well as frequency and duration of scheduling future physical therapy appointments. Time was also taken on this day to answer all patient questions and participation in PT. Reviewed appointment policy in detail with patient and patient verbalized understanding.      Home Exercise Program: Patient was instructed in the following for HEP:     . Patient verbalized/demonstrated understanding and was issued written handout. HEP  Hams stretch, gastroc stretch 30 x 3, qs x 20, slr x 20, seated flex stretch 30 x 3      Therapeutic Exercise and NMR EXR  [] (88034) Provided verbal/tactile cueing for activities related to strengthening, flexibility, endurance, ROM for improvements in LE, proximal hip, and core control with self care, mobility, lifting, ambulation.  [] (88955) Provided verbal/tactile cueing for activities related to improving balance, coordination, kinesthetic sense, posture, motor skill, proprioception  to assist with LE, proximal hip, and core control in self care, mobility, lifting, ambulation and eccentric single leg control.      NMR and Therapeutic Activities:    [] (80781 or 13507) Provided verbal/tactile cueing for activities related to improving balance, coordination, kinesthetic sense, posture, motor skill, proprioception and motor activation to allow for proper function of core, proximal hip and LE with self care and ADLs and functional mobility.   [] (86311) Gait Re-education- Provided training and instruction to the patient for proper LE, core and proximal hip recruitment and positioning and eccentric body weight control with ambulation re-education including up and down stairs     Home Exercise Program:    [x] (52740) Reviewed/Progressed HEP activities related to strengthening, flexibility, endurance, ROM of core, proximal hip and LE for functional self-care, mobility, lifting and ambulation/stair navigation   [] (61816)Reviewed/Progressed HEP activities related to improving balance, coordination, kinesthetic sense, posture, motor skill, proprioception of core, proximal hip and LE for self care, mobility, lifting, and ambulation/stair navigation      Manual Treatments:  PROM / STM / Oscillations-Mobs:  G-I, II, III, IV (PA's, Inf., Post.)  [] (38439) Provided manual therapy to mobilize LE, proximal hip and/or LS spine soft tissue/joints for the purpose of modulating pain, promoting relaxation,  increasing ROM, reducing/eliminating soft tissue swelling/inflammation/restriction, improving soft tissue extensibility and allowing for proper ROM for normal function with self care, mobility, lifting and ambulation. Charges:  Timed Code Treatment Minutes: 45   Total Treatment Minutes: 45      [] EVAL (LOW) 16718 (typically 20 minutes face-to-face)  [] EVAL (MOD) 51596 (typically 30 minutes face-to-face)  [] EVAL (HIGH) 75852 (typically 45 minutes face-to-face)  [] RE-EVAL     [x] YT(17630) x 1   [] Dry needle 1 or 2 Muscles (31908)  [x] NMR (84007) x 1    [] Dry needle 3+ Muscles (88943)  [x] Manual (27509) x1     [] Ultrasound (93061) x  [] TA (80613) x     [] Mech Traction (57796)  [] ES(attended) (87921)     [] ES (un) (04778):   [] Vasopump (22803) [] Ionto (98556)   [] Other:    Lidya Borrego:  Patient stated goal: \" I want to walk and function like normal \"  []? Progressing: []? Met: []? Not Met: []? Adjusted     Therapist goals for Patient:   Short Term Goals: To be achieved in: 2 weeks  1. Independent in HEP and progression per patient tolerance, in order to prevent re-injury. []? Progressing: [x]? Met: []? Not Met: []? Adjusted  2. Patient will have a decrease in pain by 25% to facilitate improvement in movement, function, and ADLs as indicated by Functional Deficits. []? Progressing: [x]? Met: []? Not Met: []? Adjusted     Long Term Goals: To be achieved in: 8 weeks  1. Disability index score of 25% or less for the LEFS to assist with reaching prior level of function. [x]? Progressing: []? Met: []? Not Met: []? Adjusted  2. Patient will demonstrate increased AROM to 110, -5  to allow for proper joint functioning as indicated by patients Functional Deficits. [x]? Progressing: []? Met: []? Not Met: []? Adjusted  3.  Patient will demonstrate an increase in Strength to good proximal hip strength and control, within 5lb HHD in LE to allow for proper functional mobility as indicated by patients Functional Deficits. [x]? Progressing: []? Met: []? Not Met: []? Adjusted  4. Patient will return togait with cane  functional activities without increased symptoms or restriction. [x]? Progressing: []? Met: []? Not Met: []? Adjusted  5. (patient specific functional goal)    [x]? Progressing: []? Met: []? Not Met: []? Adjusted         ASSESSMENT:  2/2/21  Progress has been slower then expected. I don't think she is doing her exercises at home as much as she needs to be to help accelerate progress. Discussed this wiith her . Treatment/Activity Tolerance:  [x] Patient tolerated treatment well [] Patient limited by fatique  [] Patient limited by pain  [] Patient limited by other medical complications  [x] Other: 1/12/21  Pt progressing but is still weak and lacking ext    Overall Progression Towards Functional goals/ Treatment Progress Update:  [] Patient is progressing as expected towards functional goals listed. [] Progression is slowed due to complexities/Impairments listed. [] Progression has been slowed due to co-morbidities. [x] Plan just implemented, too soon to assess goals progression <30days   [] Goals require adjustment due to lack of progress  [] Patient is not progressing as expected and requires additional follow up with physician  [] Other    Prognosis for POC: [x] Good [] Fair  [] Poor    Patient requires continued skilled intervention: [x] Yes  [] No        PLAN: LE arom, prom  strength, proprioception, gait, balance, functional activities. Mfr, joint mobs, mods as needed, hep. Progress as tolerated, rom, quads strength , end range flex and ext, progress to gait, gait with cane, balance and proprio, 12.22.20  Work on abduction strength, end range knee flex and ext.  Increase ext exs in standing    [] Continue per plan of care [] Alter current plan (see comments)  [x] Plan of care initiated [] Hold pending MD visit [] Discharge    Electronically signed by: Ursula Clayton Jaleel PT    Note: If patient does not return for scheduled/recommended follow up visits, this note will serve as a discharge from care along with the most recent update on progress.

## 2021-02-04 ENCOUNTER — HOSPITAL ENCOUNTER (OUTPATIENT)
Dept: PHYSICAL THERAPY | Age: 61
Setting detail: THERAPIES SERIES
Discharge: HOME | End: 2021-02-04
Payer: COMMERCIAL

## 2021-02-04 PROCEDURE — 97110 THERAPEUTIC EXERCISES: CPT

## 2021-02-04 PROCEDURE — 97112 NEUROMUSCULAR REEDUCATION: CPT

## 2021-02-04 PROCEDURE — 97140 MANUAL THERAPY 1/> REGIONS: CPT

## 2021-02-04 NOTE — FLOWSHEET NOTE
Physical Therapy Re-Certification Plan of Care/MD UPDATE      Dear  ,Dr. Yassine Yancey     We had the pleasure of treating the following patient for physical therapy services at [facility]. A summary of our findings can be found in the updated assessment below. This includes our plan of care. If you have any questions or concerns regarding these findings, please do not hesitate to contact me at the office phone number checked above. Thank you for the referral.     Physician Signature:________________________________Date:__________________  By signing above (or electronic signature), therapists plan is approved by physician    Date Range Of Visits: 12/10-  Total Visits to Date: 8  Overall Response to Treatment:   [x]Patient is responding well to treatment and improvement is noted with regards  to goals   []Patient should continue to improve in reasonable time if they continue HEP   []Patient has plateaued and is no longer responding to skilled PT intervention    []Patient is getting worse and would benefit from return to referring MD   []Patient unable to adhere to initial POC   []Other:       Recommendation:    [x]Continue PT 2x / wk [id number]. []Hold PT, pending MD visit            -                                                 [facility]  [address]  Phone: [phone]   Fax:     [phone]      Physical Therapy Treatment Note/ Progress Report:     Date:  2021    Patient Name:  Awilda Huynh    :  1960  MRN: [id number]    Pertinent Medical History:Additional Pertinent Hx: HTN    Medical/Treatment Diagnosis Information:  · Diagnosis: Left Distal Femoral Bdvmtwgs90/4/20  · Treatment Diagnosis: decreased abilty to a,mulate and function    Insurance/Certification information:  PT Insurance Information: [employer]  Physician Information:  Referring Practitioner: Dr. Robin Mcgill of Ankle [facility] w[facility]     Ankle DF 5     Ankle In       Ankle Ev       Strength  LEFT 2/2/21   HIP Flexors 4-  4   HIP Abductors 4-  4   HIP Ext 4-  4   Hip ER 4-  4   Knee EXT (quad) 3 4    Knee Flex (HS) 3+-  4   Ankle DF 4+  5   Ankle PF 4+  5   Ankle Inv 4+  5   Ankle EV 4+  5           Circumference  Mid apex  7 cm prox    60  67                  RESTRICTIONS/PRECAUTIONS: fwb in walker    Exercises/Interventions:     Therapeutic Ex (33511)   Min: Reps/Resistance Notes/CUES   Nu step seat 9 L3  5 min    Leg press 90 # 2 x 20, left 60# x 30    ppt With ball x 30    qs 5 sec x  2  Min on towel, manual overpressure    bridges X 30    Seated hams stretch 30 x 3    saq/laq 3# x 30         Piriformis stretch 30 x 3 , gentle manual            Sl abd / 30 X 30    Standing abd X 30 slow, right x 20         abd with tband     Hip 4 way     Lat step up 4\" x 30    Semi squat          Gait with cane     Gait in bars X 2 min sliding    incline 3 x 30 sec     hss 60 x 3    HEP                         Manual Intervention (95829)  Min: Mfr to left itb, gluts, piriformis    Knee mobs/PROM Gr 4    Tib/Fem Mobs Gr4    Patella Mobs gr4    Ankle mobs Prom flex and ext    Ext overpressure 10 sec x 30         NMR re-education (03892)  Min:  CUES NEEDED   wobble X 3 min ea    Step up 3\" x 20    Semi squat X 30    Sit to stand slowly     Semi lunge     ecc squat     Step thru X 2 min         Step thru X 3 min    Therapeutic Activity (80499)  Min:               Modalities  Min:     IFC with      CP after exercises     MH after exercises            Other Therapeutic Activities: Pt was educated on PT POC, Diagnosis, Prognosis, pathomechanics as well as frequency and duration of scheduling future physical therapy appointments. Time was also taken on this day to answer all patient questions and participation in PT. Reviewed appointment policy in detail with patient and patient verbalized understanding.      Home Exercise Program: Patient was instructed in the following for HEP:     . Patient verbalized/demonstrated understanding and was issued written handout. HEP  Hams stretch, gastroc stretch 30 x 3, qs x 20, slr x 20, seated flex stretch 30 x 3      Therapeutic Exercise and NMR EXR  [] (32905) Provided verbal/tactile cueing for activities related to strengthening, flexibility, endurance, ROM for improvements in LE, proximal hip, and core control with self care, mobility, lifting, ambulation.  [] (44829) Provided verbal/tactile cueing for activities related to improving balance, coordination, kinesthetic sense, posture, motor skill, proprioception  to assist with LE, proximal hip, and core control in self care, mobility, lifting, ambulation and eccentric single leg control.      NMR and Therapeutic Activities:    [] (61478 or 19488) Provided verbal/tactile cueing for activities related to improving balance, coordination, kinesthetic sense, posture, motor skill, proprioception and motor activation to allow for proper function of core, proximal hip and LE with self care and ADLs and functional mobility.   [] (33224) Gait Re-education- Provided training and instruction to the patient for proper LE, core and proximal hip recruitment and positioning and eccentric body weight control with ambulation re-education including up and down stairs     Home Exercise Program:    [x] (73009) Reviewed/Progressed HEP activities related to strengthening, flexibility, endurance, ROM of core, proximal hip and LE for functional self-care, mobility, lifting and ambulation/stair navigation   [] (33694)Reviewed/Progressed HEP activities related to improving balance, coordination, kinesthetic sense, posture, motor skill, proprioception of core, proximal hip and LE for self care, mobility, lifting, and ambulation/stair navigation      Manual Treatments:  PROM / STM / Oscillations-Mobs:  G-I, II, III, IV (PA's, Inf., Post.)  [] (95966) Provided manual therapy to mobilize LE, proximal hip and/or LS spine soft tissue/joints for the purpose of modulating pain, promoting relaxation,  increasing ROM, reducing/eliminating soft tissue swelling/inflammation/restriction, improving soft tissue extensibility and allowing for proper ROM for normal function with self care, mobility, lifting and ambulation. Charges:  Timed Code Treatment Minutes: 45   Total Treatment Minutes: 45      [] EVAL (LOW) 58576 (typically 20 minutes face-to-face)  [] EVAL (MOD) 01400 (typically 30 minutes face-to-face)  [] EVAL (HIGH) 26615 (typically 45 minutes face-to-face)  [] RE-EVAL     [x] KK(10314) x 1   [] Dry needle 1 or 2 Muscles (38564)  [x] NMR (97977) x 1    [] Dry needle 3+ Muscles (78054)  [x] Manual (71526) x1     [] Ultrasound (58727) x  [] TA (30221) x     [] Mech Traction (96885)  [] ES(attended) (47262)     [] ES (un) (26232):   [] Vasopump (82291) [] Ionto (87910)   [] Other:    Tala Choudhury:  Patient stated goal: \" I want to walk and function like normal \"  []? Progressing: []? Met: []? Not Met: []? Adjusted     Therapist goals for Patient:   Short Term Goals: To be achieved in: 2 weeks  1. Independent in HEP and progression per patient tolerance, in order to prevent re-injury. []? Progressing: [x]? Met: []? Not Met: []? Adjusted  2. Patient will have a decrease in pain by 25% to facilitate improvement in movement, function, and ADLs as indicated by Functional Deficits. []? Progressing: [x]? Met: []? Not Met: []? Adjusted     Long Term Goals: To be achieved in: 8 weeks  1. Disability index score of 25% or less for the LEFS to assist with reaching prior level of function. [x]? Progressing: []? Met: []? Not Met: []? Adjusted  2. Patient will demonstrate increased AROM to 110, -5  to allow for proper joint functioning as indicated by patients Functional Deficits. [x]? Progressing: []? Met: []? Not Met: []? Adjusted  3.  Patient will demonstrate an increase in Strength to good proximal hip strength and control, within 5lb HHD in LE to allow for proper functional mobility as indicated by patients Functional Deficits. [x]? Progressing: []? Met: []? Not Met: []? Adjusted  4. Patient will return togait with cane  functional activities without increased symptoms or restriction. [x]? Progressing: []? Met: []? Not Met: []? Adjusted  5. (patient specific functional goal)    [x]? Progressing: []? Met: []? Not Met: []? Adjusted         ASSESSMENT:  2/2/21  Progress has been slower then expected. I don't think she is doing her exercises at home as much as she needs to be to help accelerate progress. Discussed this wiith her . Treatment/Activity Tolerance:  [x] Patient tolerated treatment well [] Patient limited by fatique  [] Patient limited by pain  [] Patient limited by other medical complications  [x] Other: 1/12/21  Pt progressing but is still weak and lacking ext    Overall Progression Towards Functional goals/ Treatment Progress Update:  [] Patient is progressing as expected towards functional goals listed. [] Progression is slowed due to complexities/Impairments listed. [] Progression has been slowed due to co-morbidities. [x] Plan just implemented, too soon to assess goals progression <30days   [] Goals require adjustment due to lack of progress  [] Patient is not progressing as expected and requires additional follow up with physician  [] Other    Prognosis for POC: [x] Good [] Fair  [] Poor    Patient requires continued skilled intervention: [x] Yes  [] No        PLAN: LE arom, prom  strength, proprioception, gait, balance, functional activities. Mfr, joint mobs, mods as needed, hep. Progress as tolerated, rom, quads strength , end range flex and ext, progress to gait, gait with cane, balance and proprio, 12.22.20  Work on abduction strength, end range knee flex and ext.  Increase ext exs in standing    [] Continue per plan of care [] Alter current plan (see comments)  [x] Plan of care initiated [] Hold pending MD visit [] Discharge    Electronically signed by: Unruly Rincon PT    Note: If patient does not return for scheduled/recommended follow up visits, this note will serve as a discharge from care along with the most recent update on progress.

## 2021-02-09 ENCOUNTER — HOSPITAL ENCOUNTER (OUTPATIENT)
Dept: PHYSICAL THERAPY | Age: 61
Setting detail: THERAPIES SERIES
Discharge: HOME | End: 2021-02-09
Payer: COMMERCIAL

## 2021-02-09 PROCEDURE — 97112 NEUROMUSCULAR REEDUCATION: CPT

## 2021-02-09 PROCEDURE — 97110 THERAPEUTIC EXERCISES: CPT

## 2021-02-09 PROCEDURE — 97140 MANUAL THERAPY 1/> REGIONS: CPT

## 2021-02-09 NOTE — FLOWSHEET NOTE
[facility] - Outpatient Rehabilitation & Therapy  [address] [facility]. [place]  Phone: [phone]   Fax:     [phone]      Physical Therapy Treatment Note    Date:  2021    Patient Name:  Pedro Ring    :  1960  MRN: [id number]    Pertinent Medical History:Additional Pertinent Hx: HTN    Medical/Treatment Diagnosis Information:  · Diagnosis: Left Distal Femoral Qosvrtrk08/4/20  · Treatment Diagnosis: decreased abilty to a,mulate and function    Insurance/Certification information:  PT Insurance Information: [facility]  Physician Information:  Referring Practitioner: Dr. David Jennings of care signed (Y/N): routed    Date of Patient follow up with Physician:      Progress Report: []  Yes  [x]  No     Date Range for reporting period:  Beginnin2021  Ending:      Progress report due (10 Rx/or 30 days whichever is less):      Recertification due (POC duration/ or 90 days whichever is less): 3/10/20     Visit # POC/Insurance Allowable Auth Needed   12 30 []Yes   []No     Latex Allergy:  [x]NO      []YES  Preferred Language for Healthcare:   [x]English       []Other:    Functional Scale:      Date assessed: 21  Test: LEFS-44  Score:    Pain level:  410     History of Injury: Patient is a 62 y/o female who fell in the shower and fx her distal femur on 20 with a resultant surgery on 20. She was seen at home for PT up until last week. She c/o moderate pain at this time. She wakes from pain and is limited with all activities. She is ambulating with a walker and a rolling walker and I fwb with the walker. She works a a  and sits most of the day. She hoeps to return to full function. She did fall last week at home. She had a Retrograde intramedullary rodding procedure to fix it. SUBJECTIVE:  20 Patient reports soreness on lateral hip area.   20  Pt states, \" I am sore in my hip, concerned about the pain \"  12/24/20  Pt states, \" doing ok \"  12/29/20  Pt states, \" seems to be getting stronger \"  12/31/20 Patient reports soreness is minimal just stiffness. 1/5/20  Pt states, \" doing a little better \"  1/12/21   Pt states, \" walking around my house with the cane more \"  01/28/20 Patient reports she is walking more with a cane. States just soreness in her quad and around the knee cap. 2/2/21  Pt states, \" I am trying to work on it at home \"  2/4/21  Pt states, \" doing ok, walking better at times \"  2/9/21: Pt reports she is  a little more sore due to weather.     OBJECTIVE:  ROM LEFT 2/2/21 2/9/21   HIP Flex 80 95    HIP Abd 15 25     HIP Ext 5 10     HIP IR 15 25    HIP ER 10 30     Knee ext -15 -7  -5   Knee Flex 90 110  110   Ankle PF wfl      Ankle DF 5      Ankle In        Ankle Ev        Strength  LEFT 2/2/21    HIP Flexors 4-  4    HIP Abductors 4-  4    HIP Ext 4-  4    Hip ER 4-  4    Knee EXT (quad) 3 4     Knee Flex (HS) 3+-  4    Ankle DF 4+  5    Ankle PF 4+  5    Ankle Inv 4+  5    Ankle EV 4+  5             Circumference  Mid apex  7 cm prox    60  67                   RESTRICTIONS/PRECAUTIONS: fwb in walker    Exercises/Interventions:     Therapeutic Ex (16303)   Min: Reps/Resistance Notes/CUES   Nu step seat 9 L3  5 min    Leg press 90 # 2 x 20, left 60# x 30    ppt With ball x 30    qs 5 sec x  2  Min on towel, manual overpressure     X 30    Seated hams stretch 30 x 3    saq/laq 3# x 30         Piriformis stretch 30 x 3 , gentle manual             X 30    Standing abd X 30 slow, right x 20             Hip 4 way     Lat step up 4\" x 30    Semi squat          Gait with cane     Gait in bars X 2 min sliding    incline 3 x 30 sec     hss 60 x 3    HEP                         Manual Intervention (77430)  Min: Mfr to left itb, gluts, piriformis    Knee mobs/PROM Gr 4    Tib/Fem Mobs Gr4    Patella Mobs gr4    Ankle mobs Prom flex and ext    Ext overpressure 10 sec x 30         NMR re-education (62306)  Min:  CUES NEEDED   wobble X 3 min ea    Step up 3\" x 20    Semi squat X 30    Sit to stand slowly     Semi lunge     ecc squat     Step thru X 2 min          X 3 min    Therapeutic Activity (56579)  Min:               Modalities  Min:     IFC with      CP after exercises     MH after exercises            Other Therapeutic Activities: Pt was educated on PT POC, Diagnosis, Prognosis, pathomechanics as well as frequency and duration of scheduling future physical therapy appointments. Time was also taken on this day to answer all patient questions and participation in PT. Reviewed appointment policy in detail with patient and patient verbalized understanding. Home Exercise Program: Patient was instructed in the following for HEP:     . Patient verbalized/demonstrated understanding and was issued written handout. HEP  Hams stretch, gastroc stretch 30 x 3, qs x 20, slr x 20, seated flex stretch 30 x 3      Therapeutic Exercise and NMR EXR  [x] (21017) Provided verbal/tactile cueing for activities related to strengthening, flexibility, endurance, ROM for improvements in LE, proximal hip, and core control with self care, mobility, lifting, ambulation.  [] (92832) Provided verbal/tactile cueing for activities related to improving balance, coordination, kinesthetic sense, posture, motor skill, proprioception  to assist with LE, proximal hip, and core control in self care, mobility, lifting, ambulation and eccentric single leg control.      NMR and Therapeutic Activities:    [] (61491 or 16310) Provided verbal/tactile cueing for activities related to improving balance, coordination, kinesthetic sense, posture, motor skill, proprioception and motor activation to allow for proper function of core, proximal hip and LE with self care and ADLs and functional mobility.   [] (54231) Gait Re-education- Provided training and instruction to the patient for proper LE, core and proximal hip recruitment and positioning and eccentric body weight control with ambulation re-education including up and down stairs     Home Exercise Program:    [x] (61310) Reviewed/Progressed HEP activities related to strengthening, flexibility, endurance, ROM of core, proximal hip and LE for functional self-care, mobility, lifting and ambulation/stair navigation   [] (80849)Reviewed/Progressed HEP activities related to improving balance, coordination, kinesthetic sense, posture, motor skill, proprioception of core, proximal hip and LE for self care, mobility, lifting, and ambulation/stair navigation      Manual Treatments:  PROM / STM / Oscillations-Mobs:  G-I, II, III, IV (PA's, Inf., Post.)  [x] (52573) Provided manual therapy to mobilize LE, proximal hip and/or LS spine soft tissue/joints for the purpose of modulating pain, promoting relaxation,  increasing ROM, reducing/eliminating soft tissue swelling/inflammation/restriction, improving soft tissue extensibility and allowing for proper ROM for normal function with self care, mobility, lifting and ambulation. Charges:  Timed Code Treatment Minutes: 65   Total Treatment Minutes: 65      2/9/21: More time available due to cancellation after her appt. [] EVAL (LOW) 41876 (typically 20 minutes face-to-face)  [] EVAL (MOD) 55051 (typically 30 minutes face-to-face)  [] EVAL (HIGH) 68793 (typically 45 minutes face-to-face)  [] RE-EVAL     [x] MB(17555) x 2   [] Dry needle 1 or 2 Muscles (34204)  [x] NMR (21414) x 1    [] Dry needle 3+ Muscles (07888)  [x] Manual (43857) x1     [] Ultrasound (86032) x  [] TA (79061) x     [] Mech Traction (64927)  [] ES(attended) (06522)     [] ES (un) (12319):   [] Vasopump (55808) [] Ionto (71962)   [] Other:    Brigitte Jorgensen:  Patient stated goal: \" I want to walk and function like normal \"  []? Progressing: []? Met: []? Not Met: []? Adjusted     Therapist goals for Patient:   Short Term Goals: To be achieved in: 2 weeks  1.  Independent in HEP and progression per patient tolerance, in order to prevent re-injury. []? Progressing: [x]? Met: []? Not Met: []? Adjusted  2. Patient will have a decrease in pain by 25% to facilitate improvement in movement, function, and ADLs as indicated by Functional Deficits. []? Progressing: [x]? Met: []? Not Met: []? Adjusted     Long Term Goals: To be achieved in: 8 weeks  1. Disability index score of 25% or less for the LEFS to assist with reaching prior level of function. [x]? Progressing: []? Met: []? Not Met: []? Adjusted  2. Patient will demonstrate increased AROM to 110, -5  to allow for proper joint functioning as indicated by patients Functional Deficits. [x]? Progressing: []? Met: []? Not Met: []? Adjusted  3. Patient will demonstrate an increase in Strength to good proximal hip strength and control, within 5lb HHD in LE to allow for proper functional mobility as indicated by patients Functional Deficits. [x]? Progressing: []? Met: []? Not Met: []? Adjusted  4. Patient will return togait with cane  functional activities without increased symptoms or restriction. [x]? Progressing: []? Met: []? Not Met: []? Adjusted  5. (patient specific functional goal)    [x]? Progressing: []? Met: []? Not Met: []? Adjusted         ASSESSMENT:  2/2/21  Progress has been slower then expected. I don't think she is doing her exercises at home as much as she needs to be to help accelerate progress. Discussed this wiith her . Treatment/Activity Tolerance:  [x] Patient tolerated treatment well [] Patient limited by fatique  [] Patient limited by pain  [] Patient limited by other medical complications  [x] Other: 1/12/21  Pt progressing but is still weak and lacking ext    Overall Progression Towards Functional goals/ Treatment Progress Update:  [] Patient is progressing as expected towards functional goals listed. [] Progression is slowed due to complexities/Impairments listed.   [] Progression has been slowed due to co-morbidities. [x] Plan just implemented, too soon to assess goals progression <30days   [] Goals require adjustment due to lack of progress  [] Patient is not progressing as expected and requires additional follow up with physician  [] Other    Prognosis for POC: [x] Good [] Fair  [] Poor    Patient requires continued skilled intervention: [x] Yes  [] No        PLAN: LE arom, prom  strength, proprioception, gait, balance, functional activities. Mfr, joint mobs, mods as needed, hep. Progress as tolerated, rom, quads strength , end range flex and ext, progress to gait, gait with cane, balance and proprio, 12.22.20  Work on abduction strength, end range knee flex and ext. Increase ext exs in standing    [x] Continue per plan of care [] Alter current plan (see comments)  [] Plan of care initiated [] Hold pending MD visit [] Discharge    Electronically signed by: Jonathan Vieira PT    Note: If patient does not return for scheduled/recommended follow up visits, this note will serve as a discharge from care along with the most recent update on progress.

## 2021-02-11 ENCOUNTER — HOSPITAL ENCOUNTER (OUTPATIENT)
Dept: PHYSICAL THERAPY | Age: 61
Setting detail: THERAPIES SERIES
Discharge: HOME | End: 2021-02-11
Payer: COMMERCIAL

## 2021-02-11 PROCEDURE — 97140 MANUAL THERAPY 1/> REGIONS: CPT

## 2021-02-11 PROCEDURE — 97110 THERAPEUTIC EXERCISES: CPT

## 2021-02-11 PROCEDURE — 97112 NEUROMUSCULAR REEDUCATION: CPT

## 2021-02-11 NOTE — FLOWSHEET NOTE
[place] [place] [email or web address]  Phone: [phone]   Fax:     [phone]     Physical Therapy Discharge Summary    Dear  ,    We had the pleasure of treating the following patient for physical therapy services at [address] [place] [address]. A summary of our findings can be found in the discharge summary below. If you have any questions or concerns regarding these findings, please do not hesitate to contact me at the office phone number above. Thank you for the referral.     Physician Signature:________________________________Date:__________________  By signing above (or electronic signature), therapists plan is approved by physician      Overall Response to Treatment:   [x]Patient is responding well to treatment and improvement is noted with regards  to goals   []Patient should continue to improve in reasonable time if they continue HEP   []Patient has plateaued and is no longer responding to skilled PT intervention    []Patient is getting worse and would benefit from return to referring MD   []Patient unable to adhere to initial POC   []Other:     Date range of Visits:12/10-  Total Visits:13  -                                                [place]  [place] [facility] [address]  Phone: [phone]   Fax:     [phone]      Physical Therapy Treatment Note    Date:  2021    Patient Name:  Piyush Hawkins    :  1960  MRN: [id number]    Pertinent Medical History:Additional Pertinent Hx: HTN    Medical/Treatment Diagnosis Information:  · Diagnosis: Left Distal Femoral Hjhlyqdj74/4/20  · Treatment Diagnosis: decreased abilty to a,mulate and function    Insurance/Certification information:  PT Insurance Information: [facility]  Physician Information:  Referring Practitioner: Dr. Tati Ying of care signed (Y/N): routed    Date of Patient follow up with Physician:      Progress Report: []  Yes  [x]  No     Date Range for reporting period:  Beginnin2021  Ending:      Progress report due (10 Rx/or 30 days whichever is less): /     Recertification due (POC duration/ or 90 days whichever is less): 3/10/20     Visit # POC/Insurance Allowable Auth Needed   13 30 []Yes   []No     Latex Allergy:  [x]NO      []YES  Preferred Language for Healthcare:   [x]English       []Other:    Functional Scale:      Date assessed: 21  Test: LEFS-44  Score:    Pain level:  0-210     History of Injury: Patient is a 62 y/o female who fell in the shower and fx her distal femur on 20 with a resultant surgery on 20. She was seen at home for PT up until last week. She c/o moderate pain at this time. She wakes from pain and is limited with all activities. She is ambulating with a walker and a rolling walker and I fwb with the walker. She works a a  and sits most of the day. She hoeps to return to full function. She did fall last week at home. She had a Retrograde intramedullary rodding procedure to fix it. SUBJECTIVE:  20 Patient reports soreness on lateral hip area. 20  Pt states, \" I am sore in my hip, concerned about the pain \"  20  Pt states, \" doing ok \"  20  Pt states, \" seems to be getting stronger \"  20 Patient reports soreness is minimal just stiffness. 20  Pt states, \" doing a little better \"  21   Pt states, \" walking around my house with the cane more \"  20 Patient reports she is walking more with a cane. States just soreness in her quad and around the knee cap. 21  Pt states, \" I am trying to work on it at home \"  21  Pt states, \" doing ok, walking better at times \"  21: Pt reports she is  a little more sore due to weather.   21  Pt states, \" Doing pretty good today \"    OBJECTIVE:  ROM LEFT 21   HIP Flex 80 95    HIP Abd 15 25     HIP Ext 5 10     HIP IR 15 25    HIP ER 10 30     Knee ext -15 -7  -5   Knee Flex 90 110  110   Ankle PF wfl      Ankle DF 5      Ankle In        Ankle Ev        Strength  LEFT 2/2/21    HIP Flexors 4-  4    HIP Abductors 4-  4    HIP Ext 4-  4    Hip ER 4-  4    Knee EXT (quad) 3 4     Knee Flex (HS) 3+-  4    Ankle DF 4+  5    Ankle PF 4+  5    Ankle Inv 4+  5    Ankle EV 4+  5             Circumference  Mid apex  7 cm prox    60  67                   RESTRICTIONS/PRECAUTIONS: fwb in walker    Exercises/Interventions:     Therapeutic Ex (12411)   Min: Reps/Resistance Notes/CUES   Nu step seat 9 L3  5 min    Leg press 95 # 2 x 20, left 60# x 30    ppt With ball x 30    qs 5 sec x  2  Min on towel, manual overpressure     single leg bridges X 30    Seated hams stretch 30 x 3    saq/laq 4# x 30         Piriformis stretch 30 x 3 , gentle manual             X 30    Standing abd X 30 slow, right x 20             Hip 4 way     Lat step up 4\" x 30    Semi squat          Gait with cane     Gait in bars X 2 min sliding    incline 3 x 30 sec     hss 60 x 3    HEP                         Manual Intervention (01811)  Min: Mfr to left itb, gluts, piriformis    Knee mobs/PROM Gr 4    Tib/Fem Mobs Gr4    Patella Mobs gr4    Ankle mobs Prom flex and ext    Ext overpressure 10 sec x 30         NMR re-education (38873)  Min:  CUES NEEDED   wobble X 3 min ea    Step up 3\" x 20    Semi squat X 30    Sit to stand slowly     Semi lunge     ecc squat     Step thru X 2 min          X 3 min    Therapeutic Activity (96868)  Min:               Modalities  Min:     IFC with      CP after exercises     MH after exercises            Other Therapeutic Activities: Pt was educated on PT POC, Diagnosis, Prognosis, pathomechanics as well as frequency and duration of scheduling future physical therapy appointments. Time was also taken on this day to answer all patient questions and participation in PT.  Reviewed appointment policy in detail with patient and patient verbalized understanding. Home Exercise Program: Patient was instructed in the following for HEP:     . Patient verbalized/demonstrated understanding and was issued written handout. HEP  Hams stretch, gastroc stretch 30 x 3, qs x 20, slr x 20, seated flex stretch 30 x 3      Therapeutic Exercise and NMR EXR  [x] (78778) Provided verbal/tactile cueing for activities related to strengthening, flexibility, endurance, ROM for improvements in LE, proximal hip, and core control with self care, mobility, lifting, ambulation.  [] (42918) Provided verbal/tactile cueing for activities related to improving balance, coordination, kinesthetic sense, posture, motor skill, proprioception  to assist with LE, proximal hip, and core control in self care, mobility, lifting, ambulation and eccentric single leg control.      NMR and Therapeutic Activities:    [] (03967 or 51920) Provided verbal/tactile cueing for activities related to improving balance, coordination, kinesthetic sense, posture, motor skill, proprioception and motor activation to allow for proper function of core, proximal hip and LE with self care and ADLs and functional mobility.   [] (03122) Gait Re-education- Provided training and instruction to the patient for proper LE, core and proximal hip recruitment and positioning and eccentric body weight control with ambulation re-education including up and down stairs     Home Exercise Program:    [x] (54696) Reviewed/Progressed HEP activities related to strengthening, flexibility, endurance, ROM of core, proximal hip and LE for functional self-care, mobility, lifting and ambulation/stair navigation   [] (93439)Reviewed/Progressed HEP activities related to improving balance, coordination, kinesthetic sense, posture, motor skill, proprioception of core, proximal hip and LE for self care, mobility, lifting, and ambulation/stair navigation      Manual Treatments:  PROM / STM / Oscillations-Mobs:  G-I, II, III, IV (PA's, Inf., Post.)  [x] (16874) Provided manual therapy to mobilize LE, proximal hip and/or LS spine soft tissue/joints for the purpose of modulating pain, promoting relaxation,  increasing ROM, reducing/eliminating soft tissue swelling/inflammation/restriction, improving soft tissue extensibility and allowing for proper ROM for normal function with self care, mobility, lifting and ambulation. Charges:  Timed Code Treatment Minutes: 65   Total Treatment Minutes: 65      2/9/21: More time available due to cancellation after her appt. [] EVAL (LOW) 57840 (typically 20 minutes face-to-face)  [] EVAL (MOD) 59952 (typically 30 minutes face-to-face)  [] EVAL (HIGH) 27861 (typically 45 minutes face-to-face)  [] RE-EVAL     [x] TA(99408) x 2   [] Dry needle 1 or 2 Muscles (06390)  [x] NMR (49423) x 1    [] Dry needle 3+ Muscles (14093)  [x] Manual (77098) x1     [] Ultrasound (29437) x  [] TA (77304) x     [] Mech Traction (59348)  [] ES(attended) (66205)     [] ES (un) (23053):   [] Vasopump (74306) [] Ionto (74738)   [] Other:    Giselle Plane:  Patient stated goal: \" I want to walk and function like normal \"  []? Progressing: []? Met: []? Not Met: []? Adjusted     Therapist goals for Patient:   Short Term Goals: To be achieved in: 2 weeks  1. Independent in HEP and progression per patient tolerance, in order to prevent re-injury. []? Progressing: [x]? Met: []? Not Met: []? Adjusted  2. Patient will have a decrease in pain by 25% to facilitate improvement in movement, function, and ADLs as indicated by Functional Deficits. []? Progressing: [x]? Met: []? Not Met: []? Adjusted     Long Term Goals: To be achieved in: 8 weeks  1. Disability index score of 25% or less for the LEFS to assist with reaching prior level of function. []? Progressing: [x]? Met: []? Not Met: []? Adjusted  2.  Patient will demonstrate increased AROM to 110, -5  to allow for proper joint functioning as indicated by patients Functional Deficits. [x]? Progressing: [x]? Met: []? Not Met: []? Adjusted  3. Patient will demonstrate an increase in Strength to good proximal hip strength and control, within 5lb HHD in LE to allow for proper functional mobility as indicated by patients Functional Deficits. [x]? Progressing: []? Met: []? Not Met: []? Adjusted  4. Patient will return togait with cane  functional activities without increased symptoms or restriction. [x]? Progressing: []? Met: []? Not Met: []? Adjusted  5. (patient specific functional goal)    [x]? Progressing: []? Met: []? Not Met: []? Adjusted         ASSESSMENT:  2/2/21  Progress has been slower then expected. I don't think she is doing her exercises at home as much as she needs to be to help accelerate progress. Discussed this wiith her . Treatment/Activity Tolerance:  [x] Patient tolerated treatment well [] Patient limited by fatique  [] Patient limited by pain  [] Patient limited by other medical complications  [x] Other: 1/12/21  Pt progressing but is still weak and lacking ext    Overall Progression Towards Functional goals/ Treatment Progress Update:  [] Patient is progressing as expected towards functional goals listed. [] Progression is slowed due to complexities/Impairments listed. [] Progression has been slowed due to co-morbidities. [x] Plan just implemented, too soon to assess goals progression <30days   [] Goals require adjustment due to lack of progress  [] Patient is not progressing as expected and requires additional follow up with physician  [] Other    Prognosis for POC: [x] Good [] Fair  [] Poor    Patient requires continued skilled intervention: [x] Yes  [] No        PLAN: LE arom, prom  strength, proprioception, gait, balance, functional activities. Mfr, joint mobs, mods as needed, hep.  Progress as tolerated, rom, quads strength , end range flex and ext, progress to gait, gait with cane, balance and proprio, 12.22.20  Work on abduction strength, end range knee flex and ext. Increase ext exs in standing    [x] Continue per plan of care [] Alter current plan (see comments)  [] Plan of care initiated [] Hold pending MD visit [] Discharge    Electronically signed by: Aftab Torres PT    Note: If patient does not return for scheduled/recommended follow up visits, this note will serve as a discharge from care along with the most recent update on progress.

## 2021-02-16 ENCOUNTER — APPOINTMENT (OUTPATIENT)
Dept: PHYSICAL THERAPY | Age: 61
End: 2021-02-16
Payer: COMMERCIAL

## 2021-02-18 ENCOUNTER — HOSPITAL ENCOUNTER (OUTPATIENT)
Dept: PHYSICAL THERAPY | Age: 61
Setting detail: THERAPIES SERIES
Discharge: HOME | End: 2021-02-18
Payer: COMMERCIAL

## 2021-02-18 NOTE — FLOWSHEET NOTE
Physical Therapy  Cancellation/No-show Note  Patient Name:  Ban Yepez  :  1960   Date:  2021  Cancelled visits to date:1  No-shows to date: 0    For today's appointment patient:  [x]  Cancelled  []  Rescheduled appointment  []  No-show     Reason given by patient:  []  Patient ill  []  Conflicting appointment  []  No transportation    []  Conflict with work  []  No reason given  [x]  Other:  snow   Comments:      Electronically signed by:  Radha Laureano, PT

## 2021-02-23 ENCOUNTER — APPOINTMENT (OUTPATIENT)
Dept: PHYSICAL THERAPY | Age: 61
End: 2021-02-23
Payer: COMMERCIAL

## 2021-02-25 ENCOUNTER — APPOINTMENT (OUTPATIENT)
Dept: PHYSICAL THERAPY | Age: 61
End: 2021-02-25
Payer: COMMERCIAL

## 2021-12-16 NOTE — PLAN OF CARE
[address] [facility]. [place] [address]  Phone: [phone]   Fax:     [phone]                                                       Physical Therapy Certification    Dear Referring Practitioner: Dr. Jacki Drew,    We had the pleasure of evaluating the following patient for physical therapy services at [facility]. A summary of our findings can be found in the initial assessment below. This includes our plan of care. If you have any questions or concerns regarding these findings, please do not hesitate to contact me at the office phone number checked above. Thank you for the referral.       Physician Signature:_______________________________Date:__________________  By signing above (or electronic signature), therapists plan is approved by physician              Patient: Naldo Miguel   : 1960   MRN: [id number]  Referring Physician: Referring Practitioner: Dr. Jacki Drew      Evaluation Date: 12/10/2020      Medical Diagnosis Information:  Diagnosis: Left Distal Femoral Fracture 20   Treatment Diagnosis: decreased abilty to a,mulate and function                                         Insurance information: PT Insurance Information: [facility]     Precautions/ Contra-indications: FWB in walker  Latex Allergy:  [x]NO      []YES  Preferred Language for Healthcare:   [x]English       []other:    C-SSRS Triggered by Intake questionnaire (Past 2 wk assessment ):   [x] No, Questionnaire did not trigger screening.   [] Yes, Patient intake triggered C-SSRS Screening      [] C-SSRS Screening completed  [] PCP notified via Epic     SUBJECTIVE: Patient is a 62 y/o female who fell in the shower and fx her distal femur on 20 with a resultant surgery on 20. She was seen at home for PT up until last week. She c/o moderate pain at this time.   She wakes from pain and is limited with all activities. She is ambulating with a walker and a rolling walker and I fwb with the walker. She works a a  and sits most of the day. She hoeps to return to full function. She did fall last week at home. She had a Retrograde intramedullary rodding procedure to fix it. Relevant Medical History:Additional Pertinent Hx: HTN  Functional Outcome Measure: LEFS = 22    Pain Scale: 5/10  Easing factors: rest  Provocative factors: use, wb     Type: [x]Constant   []Intermittent  []Radiating []Localized []other:     Numbness/Tingling: none    Occupation/School:    Living Status/Prior Level of Function: Independent with ADLs and IADLs, needs a little help into shower    OBJECTIVE:     ROM LEFT RIGHT   HIP Flex 80 wfl   HIP Abd 15    HIP Ext 5    HIP IR 15    HIP ER 10    Knee ext -15    Knee Flex 90    Ankle PF wfl    Ankle DF 5    Ankle In     Ankle Ev     Strength  LEFT RIGHT   HIP Flexors 4-    HIP Abductors 4-    HIP Ext 4-    Hip ER 4-    Knee EXT (quad) 3+    Knee Flex (HS) 3+-    Ankle DF 4+    Ankle PF 4+    Ankle Inv 4+    Ankle EV 4+         Circumference  Mid apex  7 cm prox   60  67          Reflexes/Sensation:    [x]Dermatomes/Myotomes intact    [x]Reflexes equal and normal bilaterally   []Other:    Joint mobility:    []Normal    [x]Hypo   []Hyper    Palpation: tight and tender in add, quads, hams, gastroc, peroneals, decreased patella and prox tib fib mob    Functional Mobility/Transfers: ind    Posture: ant tilt, poor ns, poor core strength    Bandages/Dressings/Incisions: healing no signs of infecton    Gait:- ambualtes with a standard walker, step to gait, er of left le, mod limp noted    Orthopedic Special Tests: na                       [x] Patient history, allergies, meds reviewed. Medical chart reviewed. See intake form.      Review Of Systems (ROS):  [x]Performed Review of systems (Integumentary, CardioPulmonary, Neurological) by intake and observation. Intake form has been scanned into medical record. Patient has been instructed to contact their primary care physician regarding ROS issues if not already being addressed at this time. Co-morbidities/Complexities (which will affect course of rehabilitation):   []None           Arthritic conditions   []Rheumatoid arthritis (M05.9)  []Osteoarthritis (M19.91)   Cardiovascular conditions   []Hypertension (I10)  []Hyperlipidemia (E78.5)  []Angina pectoris (I20)  []Atherosclerosis (I70)  []CVA Musculoskeletal conditions   []Disc pathology   []Congenital spine pathologies   []Prior surgical intervention  []Osteoporosis (M81.8)  []Osteopenia (M85.8)   Endocrine conditions   []Hypothyroid (E03.9)  []Hyperthyroid Gastrointestinal conditions   []Constipation (H81.31)   Metabolic conditions   [x]Morbid obesity (E66.01)  []Diabetes type 1(E10.65) or 2 (E11.65)   []Neuropathy (G60.9)     Pulmonary conditions   []Asthma (J45)  []Coughing   []COPD (J44.9)   Psychological Disorders  []Anxiety (F41.9)  [x]Depression (F32.9)   []Other:   []Other:          Barriers to/and or personal factors that will affect rehab potential:              []Age  []Sex    []Smoker              []Motivation/Lack of Motivation                        [x]Co-Morbidities              []Cognitive Function, education/learning barriers              []Environmental, home barriers              []profession/work barriers  []past PT/medical experience  []other:  Justification:     Falls Risk Assessment (30 days):   [x] Falls Risk assessed and no intervention required.   [] Falls Risk assessed and Patient requires intervention due to being higher risk   TUG score (>12s at risk):     [] Falls education provided, including         ASSESSMENT:   Functional Impairments:     [x]Noted lumbar/proximal hip/LE hypomobility   [x]Decreased LE functional ROM   [x]Decreased core/proximal hip strength and neuromuscular control   [x]Decreased LE functional strength [x]Reduced balance/proprioceptive control   []other:      Functional Activity Limitations (from functional questionnaire and intake)   [x]Reduced ability to tolerate prolonged functional positions   []Reduced ability or difficulty with changes of positions or transfers between positions   [x]Reduced ability to maintain good posture and demonstrate good body mechanics with sitting, bending, and lifting   [x]Reduced ability to sleep   [x] Reduced ability or tolerance with driving and/or computer work   [x]Reduced ability to perform lifting, carrying tasks   [x]Reduced ability to squat   [x]Reduced ability to forward bend   [x]Reduced ability to ambulate prolonged functional periods/distances/surfaces   [x]Reduced ability to ascend/descend stairs   [x]Reduced ability to run, hop or jump   []other:     Participation Restrictions   [x]Reduced participation in self care activities   [x]Reduced participation in home management activities   []Reduced participation in work activities   []Reduced participation in social activities. []Reduced participation in sport activities. Classification :    [x]Signs/symptoms consistent with post-surgical status including decreased ROM, strength and function.    []Signs/symptoms consistent with joint sprain/strain   []Signs/symptoms consistent with patella-femoral syndrome   []Signs/symptoms consistent with knee OA/hip OA   []Signs/symptoms consistent with internal derangement of knee/Hip   []Signs/symptoms consistent with functional hip weakness/NMR control      []Signs/symptoms consistent with tendinitis/tendinosis    []signs/symptoms consistent with pathology which may benefit from Dry needling      []other:      Prognosis/Rehab Potential:      []Excellent   [x]Good    []Fair   []Poor    Tolerance of evaluation/treatment:    []Excellent   [x]Good    []Fair   []Poor    Physical Therapy Evaluation Complexity Justification  [x] A history of present problem with:  [] no personal factors and/or comorbidities that impact the plan of care;  [x]1-2 personal factors and/or comorbidities that impact the plan of care  []3 personal factors and/or comorbidities that impact the plan of care  [x] An examination of body systems using standardized tests and measures addressing any of the following: body structures and functions (impairments), activity limitations, and/or participation restrictions;:  [x] a total of 1-2 or more elements   [] a total of 3 or more elements   [] a total of 4 or more elements   [x] A clinical presentation with:  [x] stable and/or uncomplicated characteristics   [] evolving clinical presentation with changing characteristics  [] unstable and unpredictable characteristics;   [x] Clinical decision making of [x] low, [] moderate, [] high complexity using standardized patient assessment instrument and/or measurable assessment of functional outcome. [x] EVAL (LOW) 84536 (typically 20 minutes face-to-face)  [] EVAL (MOD) 36786 (typically 30 minutes face-to-face)  [] EVAL (HIGH) 82414 (typically 45 minutes face-to-face)  [] RE-EVAL     PLAN:LE arom, prom  strength, proprioception, gait, balance, functional activities. Mfr, joint mobs, mods as needed, hep. Progress as tolerated  Frequency/Duration: 2days per week for 8-12 Weeks:  Interventions:  [x]  Therapeutic exercise including: strength training, ROM, for Lower extremity and core   [x]  NMR activation and proprioception for LE, Glutes and Core   [x]  Manual therapy as indicated for LE, Hip and spine to include: Dry Needling/IASTM, STM, PROM, Gr I-IV mobilizations, manipulation. [x] Modalities as needed that may include: thermal agents, E-stim, Biofeedback, US, iontophoresis as indicated  [x] Patient education on joint protection, postural re-education, activity modification, progression of HEP.     HEP instruction:see daily note    GOALS:  Patient stated goal: \" I want to walk and function like normal \"  [] Progressing: [] with patient